# Patient Record
Sex: FEMALE | Race: WHITE | NOT HISPANIC OR LATINO | URBAN - METROPOLITAN AREA
[De-identification: names, ages, dates, MRNs, and addresses within clinical notes are randomized per-mention and may not be internally consistent; named-entity substitution may affect disease eponyms.]

---

## 2018-10-04 ENCOUNTER — OUTPATIENT (OUTPATIENT)
Dept: OUTPATIENT SERVICES | Facility: HOSPITAL | Age: 43
LOS: 1 days | Discharge: HOME | End: 2018-10-04

## 2018-10-04 VITALS
RESPIRATION RATE: 18 BRPM | DIASTOLIC BLOOD PRESSURE: 76 MMHG | HEART RATE: 65 BPM | OXYGEN SATURATION: 100 % | HEIGHT: 71 IN | TEMPERATURE: 98 F | SYSTOLIC BLOOD PRESSURE: 119 MMHG | WEIGHT: 284.4 LBS

## 2018-10-04 DIAGNOSIS — E66.9 OBESITY, UNSPECIFIED: ICD-10-CM

## 2018-10-04 DIAGNOSIS — M24.131 OTHER ARTICULAR CARTILAGE DISORDERS, RIGHT WRIST: ICD-10-CM

## 2018-10-04 DIAGNOSIS — K25.1 ACUTE GASTRIC ULCER WITH PERFORATION: Chronic | ICD-10-CM

## 2018-10-04 DIAGNOSIS — Z01.818 ENCOUNTER FOR OTHER PREPROCEDURAL EXAMINATION: ICD-10-CM

## 2018-10-04 LAB
ALBUMIN SERPL ELPH-MCNC: 4.5 G/DL — SIGNIFICANT CHANGE UP (ref 3.5–5.2)
ALP SERPL-CCNC: 82 U/L — SIGNIFICANT CHANGE UP (ref 30–115)
ALT FLD-CCNC: 33 U/L — SIGNIFICANT CHANGE UP (ref 0–41)
ANION GAP SERPL CALC-SCNC: 14 MMOL/L — SIGNIFICANT CHANGE UP (ref 7–14)
APTT BLD: 32.5 SEC — SIGNIFICANT CHANGE UP (ref 27–39.2)
AST SERPL-CCNC: 31 U/L — SIGNIFICANT CHANGE UP (ref 0–41)
BASOPHILS # BLD AUTO: 0.05 K/UL — SIGNIFICANT CHANGE UP (ref 0–0.2)
BASOPHILS NFR BLD AUTO: 0.8 % — SIGNIFICANT CHANGE UP (ref 0–1)
BILIRUB SERPL-MCNC: 0.7 MG/DL — SIGNIFICANT CHANGE UP (ref 0.2–1.2)
BUN SERPL-MCNC: 19 MG/DL — SIGNIFICANT CHANGE UP (ref 10–20)
CALCIUM SERPL-MCNC: 9.1 MG/DL — SIGNIFICANT CHANGE UP (ref 8.5–10.1)
CHLORIDE SERPL-SCNC: 104 MMOL/L — SIGNIFICANT CHANGE UP (ref 98–110)
CO2 SERPL-SCNC: 23 MMOL/L — SIGNIFICANT CHANGE UP (ref 17–32)
CREAT SERPL-MCNC: 0.9 MG/DL — SIGNIFICANT CHANGE UP (ref 0.7–1.5)
EOSINOPHIL # BLD AUTO: 0 K/UL — SIGNIFICANT CHANGE UP (ref 0–0.7)
EOSINOPHIL NFR BLD AUTO: 0 % — SIGNIFICANT CHANGE UP (ref 0–8)
GLUCOSE SERPL-MCNC: 83 MG/DL — SIGNIFICANT CHANGE UP (ref 70–99)
HCT VFR BLD CALC: 40.8 % — SIGNIFICANT CHANGE UP (ref 37–47)
HGB BLD-MCNC: 13.7 G/DL — SIGNIFICANT CHANGE UP (ref 12–16)
IMM GRANULOCYTES NFR BLD AUTO: 0.5 % — HIGH (ref 0.1–0.3)
INR BLD: 1.09 RATIO — SIGNIFICANT CHANGE UP (ref 0.65–1.3)
LYMPHOCYTES # BLD AUTO: 2.69 K/UL — SIGNIFICANT CHANGE UP (ref 1.2–3.4)
LYMPHOCYTES # BLD AUTO: 45 % — SIGNIFICANT CHANGE UP (ref 20.5–51.1)
MCHC RBC-ENTMCNC: 29.7 PG — SIGNIFICANT CHANGE UP (ref 27–31)
MCHC RBC-ENTMCNC: 33.6 G/DL — SIGNIFICANT CHANGE UP (ref 32–37)
MCV RBC AUTO: 88.3 FL — SIGNIFICANT CHANGE UP (ref 81–99)
MONOCYTES # BLD AUTO: 0.51 K/UL — SIGNIFICANT CHANGE UP (ref 0.1–0.6)
MONOCYTES NFR BLD AUTO: 8.5 % — SIGNIFICANT CHANGE UP (ref 1.7–9.3)
NEUTROPHILS # BLD AUTO: 2.7 K/UL — SIGNIFICANT CHANGE UP (ref 1.4–6.5)
NEUTROPHILS NFR BLD AUTO: 45.2 % — SIGNIFICANT CHANGE UP (ref 42.2–75.2)
NRBC # BLD: 0 /100 WBCS — SIGNIFICANT CHANGE UP (ref 0–0)
PLATELET # BLD AUTO: 258 K/UL — SIGNIFICANT CHANGE UP (ref 130–400)
POTASSIUM SERPL-MCNC: 4.7 MMOL/L — SIGNIFICANT CHANGE UP (ref 3.5–5)
POTASSIUM SERPL-SCNC: 4.7 MMOL/L — SIGNIFICANT CHANGE UP (ref 3.5–5)
PROT SERPL-MCNC: 7.4 G/DL — SIGNIFICANT CHANGE UP (ref 6–8)
PROTHROM AB SERPL-ACNC: 11.8 SEC — SIGNIFICANT CHANGE UP (ref 9.95–12.87)
RBC # BLD: 4.62 M/UL — SIGNIFICANT CHANGE UP (ref 4.2–5.4)
RBC # FLD: 12.8 % — SIGNIFICANT CHANGE UP (ref 11.5–14.5)
SODIUM SERPL-SCNC: 141 MMOL/L — SIGNIFICANT CHANGE UP (ref 135–146)
WBC # BLD: 5.98 K/UL — SIGNIFICANT CHANGE UP (ref 4.8–10.8)
WBC # FLD AUTO: 5.98 K/UL — SIGNIFICANT CHANGE UP (ref 4.8–10.8)

## 2018-10-04 NOTE — H&P PST ADULT - HISTORY OF PRESENT ILLNESS
"SHE'S CLEANING OUT THE FLUID IN MY RIGHT WRIST AND MAYBE FIXING A TEAR"  PT CURRENTLY DENIES CHEST PAIN, PALPITATIONS, DYSURIA, RECENT ILLNESS  EXERCISE TOLERANCE 10 BLOCKS  PT DENIES ANY RASHES, ABRASION, OR OPEN WOUNDS OR CUTS "SHE'S CLEANING OUT THE FLUID IN MY RIGHT WRIST AND MAYBE FIXING A TEAR"  PT CURRENTLY DENIES CHEST PAIN, PALPITATIONS, DYSURIA, RECENT ILLNESS  EXERCISE TOLERANCE 10 BLOCKS  PT DENIES ANY RASHES, ABRASION, OR OPEN WOUNDS OR CUTS    AS PER THE PT, THIS IS A COMPLETE MEDICAL AND SURGICAL HX, INCLUDING MEDICATIONS PRESCRIBED AND OVER THE COUNTER

## 2018-10-18 ENCOUNTER — OUTPATIENT (OUTPATIENT)
Dept: OUTPATIENT SERVICES | Facility: HOSPITAL | Age: 43
LOS: 1 days | Discharge: HOME | End: 2018-10-18

## 2018-10-18 ENCOUNTER — RESULT REVIEW (OUTPATIENT)
Age: 43
End: 2018-10-18

## 2018-10-18 VITALS
SYSTOLIC BLOOD PRESSURE: 116 MMHG | OXYGEN SATURATION: 99 % | HEART RATE: 65 BPM | RESPIRATION RATE: 18 BRPM | DIASTOLIC BLOOD PRESSURE: 73 MMHG

## 2018-10-18 VITALS
TEMPERATURE: 98 F | SYSTOLIC BLOOD PRESSURE: 125 MMHG | DIASTOLIC BLOOD PRESSURE: 84 MMHG | RESPIRATION RATE: 18 BRPM | HEIGHT: 71 IN | WEIGHT: 284.4 LBS | HEART RATE: 73 BPM

## 2018-10-18 DIAGNOSIS — K25.1 ACUTE GASTRIC ULCER WITH PERFORATION: Chronic | ICD-10-CM

## 2018-10-18 RX ORDER — ONDANSETRON 8 MG/1
4 TABLET, FILM COATED ORAL ONCE
Qty: 0 | Refills: 0 | Status: DISCONTINUED | OUTPATIENT
Start: 2018-10-18 | End: 2018-11-02

## 2018-10-18 RX ORDER — OXYCODONE AND ACETAMINOPHEN 5; 325 MG/1; MG/1
1 TABLET ORAL ONCE
Qty: 0 | Refills: 0 | Status: DISCONTINUED | OUTPATIENT
Start: 2018-10-18 | End: 2018-10-18

## 2018-10-18 RX ORDER — FAMOTIDINE 10 MG/ML
20 INJECTION INTRAVENOUS ONCE
Qty: 0 | Refills: 0 | Status: DISCONTINUED | OUTPATIENT
Start: 2018-10-18 | End: 2018-11-02

## 2018-10-18 RX ORDER — DIPHENHYDRAMINE HCL 50 MG
50 CAPSULE ORAL ONCE
Qty: 0 | Refills: 0 | Status: COMPLETED | OUTPATIENT
Start: 2018-10-18 | End: 2018-10-18

## 2018-10-18 RX ORDER — SODIUM CHLORIDE 9 MG/ML
1000 INJECTION, SOLUTION INTRAVENOUS
Qty: 0 | Refills: 0 | Status: DISCONTINUED | OUTPATIENT
Start: 2018-10-18 | End: 2018-11-02

## 2018-10-18 RX ORDER — HYDROMORPHONE HYDROCHLORIDE 2 MG/ML
0.5 INJECTION INTRAMUSCULAR; INTRAVENOUS; SUBCUTANEOUS
Qty: 0 | Refills: 0 | Status: DISCONTINUED | OUTPATIENT
Start: 2018-10-18 | End: 2018-10-18

## 2018-10-18 RX ADMIN — SODIUM CHLORIDE 100 MILLILITER(S): 9 INJECTION, SOLUTION INTRAVENOUS at 12:04

## 2018-10-18 RX ADMIN — Medication 50 MILLIGRAM(S): at 09:30

## 2018-10-18 NOTE — BRIEF OPERATIVE NOTE - POST-OP DX
Degenerative TFCC tear, right  10/18/2018    Active  Leola Melo Synovial hypertrophy of right hand, not elsewhere classified  10/18/2018  right wrist  Active  Leola Melo

## 2018-10-18 NOTE — BRIEF OPERATIVE NOTE - PROCEDURE
<<-----Click on this checkbox to enter Procedure Arthroscopy of wrist with debridement and repair of triangular fibrocartilage complex (TFCC)  10/18/2018    Active  HOLLY  Arthroscopy of wrist with debridement  10/18/2018    Active  HOLLY Arthroscopy of wrist with debridement  10/18/2018    Active  Leola Melo

## 2018-10-22 LAB — SURGICAL PATHOLOGY STUDY: SIGNIFICANT CHANGE UP

## 2018-10-24 DIAGNOSIS — M67.241: ICD-10-CM

## 2018-10-24 DIAGNOSIS — Z79.891 LONG TERM (CURRENT) USE OF OPIATE ANALGESIC: ICD-10-CM

## 2018-10-24 DIAGNOSIS — E66.9 OBESITY, UNSPECIFIED: ICD-10-CM

## 2018-10-24 DIAGNOSIS — K22.70 BARRETT'S ESOPHAGUS WITHOUT DYSPLASIA: ICD-10-CM

## 2018-10-24 DIAGNOSIS — M24.131 OTHER ARTICULAR CARTILAGE DISORDERS, RIGHT WRIST: ICD-10-CM

## 2018-10-24 DIAGNOSIS — Z88.0 ALLERGY STATUS TO PENICILLIN: ICD-10-CM

## 2018-10-24 DIAGNOSIS — M24.831 OTHER SPECIFIC JOINT DERANGEMENTS OF RIGHT WRIST, NOT ELSEWHERE CLASSIFIED: ICD-10-CM

## 2019-01-16 ENCOUNTER — TRANSCRIPTION ENCOUNTER (OUTPATIENT)
Age: 44
End: 2019-01-16

## 2020-01-27 ENCOUNTER — TRANSCRIPTION ENCOUNTER (OUTPATIENT)
Age: 45
End: 2020-01-27

## 2020-02-26 PROBLEM — K22.70 BARRETT'S ESOPHAGUS WITHOUT DYSPLASIA: Chronic | Status: ACTIVE | Noted: 2018-10-04

## 2020-02-26 PROBLEM — E66.9 OBESITY, UNSPECIFIED: Chronic | Status: ACTIVE | Noted: 2018-10-04

## 2020-02-26 PROBLEM — R74.8 ABNORMAL LEVELS OF OTHER SERUM ENZYMES: Chronic | Status: ACTIVE | Noted: 2018-10-04

## 2020-03-04 ENCOUNTER — APPOINTMENT (OUTPATIENT)
Dept: PLASTIC SURGERY | Facility: CLINIC | Age: 45
End: 2020-03-04
Payer: COMMERCIAL

## 2020-03-04 VITALS — HEIGHT: 71 IN | BODY MASS INDEX: 37.8 KG/M2 | WEIGHT: 270 LBS

## 2020-03-04 DIAGNOSIS — Z78.9 OTHER SPECIFIED HEALTH STATUS: ICD-10-CM

## 2020-03-04 DIAGNOSIS — Z87.891 PERSONAL HISTORY OF NICOTINE DEPENDENCE: ICD-10-CM

## 2020-03-04 DIAGNOSIS — M79.89 OTHER SPECIFIED SOFT TISSUE DISORDERS: ICD-10-CM

## 2020-03-04 PROCEDURE — 99203 OFFICE O/P NEW LOW 30 MIN: CPT

## 2020-03-05 NOTE — HISTORY OF PRESENT ILLNESS
[FreeTextEntry1] : 46 yo F with PMH of Tavarez's esophagus who presents today for evaluation of right forearm soft tissue mass for the past few weeks. Patient reports a sudden onset of right forearm nodule in the absence of trauma associated with pain and discomfort in the area upon contact. States the mass waxes and wanes. Denies any skin changes, pigmentation, drainage, tingling or paresthesia radiating to the hand. \par Denies any family h/o skin cancer. 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

## 2020-03-05 NOTE — PHYSICAL EXAM
[de-identified] : Well-developed, pleasant female, NAD [de-identified] : NC/AT [de-identified] : PERRL [de-identified] : supple [de-identified] : good inspiratory effort  [de-identified] : RAJWINDERR [de-identified] : soft, nontender  [de-identified] : Right proximal volar forearm with a small (<1 cm) somewhat firm subcutaneous mass, slightly tender to palpation, mobile, no overlying skin changes, no punctum, no pigmentation or cellulitis changes, FROM of right elbow and hand, well-healed surgical scars over dorsal aspect of hand and wrist s/p TFCC tear repair

## 2020-03-05 NOTE — ASSESSMENT
[FreeTextEntry1] : 44 yo F with right proximal forearm soft tissue mass likely lipoma but with somewhat atypical presentation (pain?). \par \par - recommend upper extremity US to delineate soft tissue mass\par - patient reassured\par - all questions answered\par - f/u with Dr. Mckee after study to discuss results and treatment plan- observation vs. excision

## 2020-03-15 ENCOUNTER — FORM ENCOUNTER (OUTPATIENT)
Age: 45
End: 2020-03-15

## 2020-03-16 ENCOUNTER — OUTPATIENT (OUTPATIENT)
Dept: OUTPATIENT SERVICES | Facility: HOSPITAL | Age: 45
LOS: 1 days | Discharge: HOME | End: 2020-03-16
Payer: COMMERCIAL

## 2020-03-16 DIAGNOSIS — K25.1 ACUTE GASTRIC ULCER WITH PERFORATION: Chronic | ICD-10-CM

## 2020-03-16 DIAGNOSIS — R22.31 LOCALIZED SWELLING, MASS AND LUMP, RIGHT UPPER LIMB: ICD-10-CM

## 2020-03-16 DIAGNOSIS — M79.89 OTHER SPECIFIED SOFT TISSUE DISORDERS: ICD-10-CM

## 2020-03-16 PROCEDURE — 76882 US LMTD JT/FCL EVL NVASC XTR: CPT | Mod: 26,RT

## 2020-04-06 ENCOUNTER — APPOINTMENT (OUTPATIENT)
Dept: PLASTIC SURGERY | Facility: CLINIC | Age: 45
End: 2020-04-06

## 2020-08-12 ENCOUNTER — TRANSCRIPTION ENCOUNTER (OUTPATIENT)
Age: 45
End: 2020-08-12

## 2020-08-12 ENCOUNTER — APPOINTMENT (OUTPATIENT)
Dept: OBGYN | Facility: CLINIC | Age: 45
End: 2020-08-12
Payer: COMMERCIAL

## 2020-08-12 VITALS
BODY MASS INDEX: 39.2 KG/M2 | DIASTOLIC BLOOD PRESSURE: 73 MMHG | TEMPERATURE: 97.9 F | HEART RATE: 78 BPM | WEIGHT: 280 LBS | SYSTOLIC BLOOD PRESSURE: 114 MMHG | HEIGHT: 71 IN

## 2020-08-12 DIAGNOSIS — Z30.431 ENCOUNTER FOR ROUTINE CHECKING OF INTRAUTERINE CONTRACEPTIVE DEVICE: ICD-10-CM

## 2020-08-12 DIAGNOSIS — R87.619 UNSPECIFIED ABNORMAL CYTOLOGICAL FINDINGS IN SPECIMENS FROM CERVIX UTERI: ICD-10-CM

## 2020-08-12 DIAGNOSIS — Z78.9 OTHER SPECIFIED HEALTH STATUS: ICD-10-CM

## 2020-08-12 DIAGNOSIS — Z83.3 FAMILY HISTORY OF DIABETES MELLITUS: ICD-10-CM

## 2020-08-12 DIAGNOSIS — B97.7 PAPILLOMAVIRUS AS THE CAUSE OF DISEASES CLASSIFIED ELSEWHERE: ICD-10-CM

## 2020-08-12 DIAGNOSIS — Z86.19 PERSONAL HISTORY OF OTHER INFECTIOUS AND PARASITIC DISEASES: ICD-10-CM

## 2020-08-12 PROCEDURE — 99396 PREV VISIT EST AGE 40-64: CPT

## 2020-08-12 NOTE — CHIEF COMPLAINT
[Annual Visit] : annual visit [FreeTextEntry1] : Patient is here for annual exam , up to date with PE , mammography due now , not getting menses Mirena  IUD inserted one year ago , # 3 , hx abnormal Pap , HPV , no complaints today

## 2020-08-12 NOTE — DISCUSSION/SUMMARY
[FreeTextEntry1] : Patient here for annual exam and to check IUD.\par Has history of abnormal pap and HPV.\par No complaints.\par \par Caron Morton M.D.\par

## 2020-08-12 NOTE — BEGINNING OF VISIT
[Patient] : patient
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

## 2020-08-12 NOTE — PHYSICAL EXAM
[Awake] : awake [Alert] : alert [Acute Distress] : no acute distress [Mass] : no breast mass [Axillary LAD] : no axillary lymphadenopathy [Nipple Discharge] : no nipple discharge [Soft] : soft [Tender] : non tender [Oriented x3] : oriented to person, place, and time [Normal] : uterus [No Bleeding] : there was no active vaginal bleeding [IUD String] : had an IUD string protruding out [Uterine Adnexae] : were not tender and not enlarged

## 2020-08-12 NOTE — HISTORY OF PRESENT ILLNESS
[Definite:  ___ (Date)] : the last menstrual period was [unfilled] [Menarche Age: ____] : age at menarche was [unfilled] [Contraception] : uses contraception [IUD] : has an intrauterine device [Sexually Active] : is sexually active [NA] : N/A [Male ___] : [unfilled] male [1 Year Ago] : 1 year ago [Good] : being in good health [Reproductive Age] : is of reproductive age

## 2020-08-20 LAB — HPV HIGH+LOW RISK DNA PNL CVX: NOT DETECTED

## 2021-07-12 ENCOUNTER — NON-APPOINTMENT (OUTPATIENT)
Age: 46
End: 2021-07-12

## 2021-07-19 ENCOUNTER — NON-APPOINTMENT (OUTPATIENT)
Age: 46
End: 2021-07-19

## 2021-07-20 ENCOUNTER — NON-APPOINTMENT (OUTPATIENT)
Age: 46
End: 2021-07-20

## 2021-07-20 ENCOUNTER — APPOINTMENT (OUTPATIENT)
Dept: OTOLARYNGOLOGY | Facility: CLINIC | Age: 46
End: 2021-07-20
Payer: COMMERCIAL

## 2021-07-20 DIAGNOSIS — M26.609 UNSPECIFIED TEMPOROMANDIBULAR JOINT DISORDER: ICD-10-CM

## 2021-07-20 DIAGNOSIS — R22.1 LOCALIZED SWELLING, MASS AND LUMP, NECK: ICD-10-CM

## 2021-07-20 DIAGNOSIS — M54.2 CERVICALGIA: ICD-10-CM

## 2021-07-20 DIAGNOSIS — H92.01 OTALGIA, RIGHT EAR: ICD-10-CM

## 2021-07-20 PROCEDURE — 31575 DIAGNOSTIC LARYNGOSCOPY: CPT

## 2021-07-20 PROCEDURE — 99204 OFFICE O/P NEW MOD 45 MIN: CPT | Mod: 25

## 2021-07-20 PROCEDURE — 99072 ADDL SUPL MATRL&STAF TM PHE: CPT

## 2021-07-20 NOTE — HISTORY OF PRESENT ILLNESS
[de-identified] : Patient presents today with c/o right sided neck pain. Pain has been present for about 1 month. Pain radiates to her right ear. No change in hearing. No dysphagia. No choking. No sore throat. She states awkward pain in her neck. Patient saw by her primary told to have swelling- possibly related to her salivary gland. Patient has been taking Motrin for pain. Muscle relaxant was not helpful. Pt has TMJ and has night brace,

## 2021-08-10 ENCOUNTER — APPOINTMENT (OUTPATIENT)
Dept: UROLOGY | Facility: CLINIC | Age: 46
End: 2021-08-10
Payer: COMMERCIAL

## 2021-08-10 VITALS — WEIGHT: 270 LBS | BODY MASS INDEX: 37.8 KG/M2 | HEIGHT: 71 IN

## 2021-08-10 LAB
BILIRUB UR QL STRIP: NORMAL
COLLECTION METHOD: NORMAL
GLUCOSE UR-MCNC: NORMAL
HCG UR QL: 0.2 EU/DL
HGB UR QL STRIP.AUTO: NORMAL
KETONES UR-MCNC: NORMAL
LEUKOCYTE ESTERASE UR QL STRIP: NORMAL
NITRITE UR QL STRIP: NORMAL
PH UR STRIP: 5.5
PROT UR STRIP-MCNC: NORMAL
SP GR UR STRIP: 1.02

## 2021-08-10 PROCEDURE — 99203 OFFICE O/P NEW LOW 30 MIN: CPT

## 2021-08-10 NOTE — END OF VISIT
[FreeTextEntry3] : Pt seen ,evaluated ,examined  by me with PA/ RN present and agree to findings , plan\par

## 2021-08-10 NOTE — PHYSICAL EXAM
[General Appearance - Well Developed] : well developed [Normal Appearance] : normal appearance [Well Groomed] : well groomed [General Appearance - In No Acute Distress] : no acute distress [Edema] : no peripheral edema [Respiration, Rhythm And Depth] : normal respiratory rhythm and effort [Exaggerated Use Of Accessory Muscles For Inspiration] : no accessory muscle use [Abdomen Soft] : soft [Abdomen Tenderness] : non-tender [Costovertebral Angle Tenderness] : no ~M costovertebral angle tenderness [Normal Station and Gait] : the gait and station were normal for the patient's age [] : no rash [No Focal Deficits] : no focal deficits [Oriented To Time, Place, And Person] : oriented to person, place, and time [Affect] : the affect was normal [Mood] : the mood was normal [Not Anxious] : not anxious [No Palpable Adenopathy] : no palpable adenopathy [FreeTextEntry1] : deferred to gyn

## 2021-08-10 NOTE — HISTORY OF PRESENT ILLNESS
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

## 2021-08-10 NOTE — ASSESSMENT
[FreeTextEntry1] : 1. AMH\par \par Plan:\par -ct urogram\par -urine cytology\par -rto 4 weeks- will need cystoscopy\par

## 2021-08-10 NOTE — LETTER BODY
[Dear  ___] : Dear  [unfilled], [Consult Letter:] : I had the pleasure of evaluating your patient, [unfilled]. [( Thank you for referring [unfilled] for consultation for _____ )] : Thank you for referring [unfilled] for consultation for [unfilled] [Consult Closing:] : Thank you very much for allowing me to participate in the care of this patient.  If you have any questions, please do not hesitate to contact me. [FreeTextEntry1] : This is to summarize the consultation for Korin Myers seen August 10, 2021.\par \par Impression= asymptomatic microhematuria. This was documented on 2 urinalysis. These urine studies all within the urologic criteria for microhematuria. History is significant for previous smoking but has since stopped. Physical exam was noncontributory\par \par Plan= CT urogram. Urine cytology. Will need cystoscopy

## 2021-08-12 ENCOUNTER — RESULT REVIEW (OUTPATIENT)
Age: 46
End: 2021-08-12

## 2021-08-12 ENCOUNTER — OUTPATIENT (OUTPATIENT)
Dept: OUTPATIENT SERVICES | Facility: HOSPITAL | Age: 46
LOS: 1 days | Discharge: HOME | End: 2021-08-12
Payer: COMMERCIAL

## 2021-08-12 DIAGNOSIS — R22.1 LOCALIZED SWELLING, MASS AND LUMP, NECK: ICD-10-CM

## 2021-08-12 DIAGNOSIS — K25.1 ACUTE GASTRIC ULCER WITH PERFORATION: Chronic | ICD-10-CM

## 2021-08-12 PROCEDURE — 70543 MRI ORBT/FAC/NCK W/O &W/DYE: CPT | Mod: 26

## 2021-08-18 LAB
BUN SERPL-MCNC: 18 MG/DL
CREAT SERPL-MCNC: 0.8 MG/DL
URINE CYTOLOGY: NORMAL

## 2021-08-27 ENCOUNTER — OUTPATIENT (OUTPATIENT)
Dept: OUTPATIENT SERVICES | Facility: HOSPITAL | Age: 46
LOS: 1 days | Discharge: HOME | End: 2021-08-27
Payer: COMMERCIAL

## 2021-08-27 DIAGNOSIS — R31.29 OTHER MICROSCOPIC HEMATURIA: ICD-10-CM

## 2021-08-27 DIAGNOSIS — K25.1 ACUTE GASTRIC ULCER WITH PERFORATION: Chronic | ICD-10-CM

## 2021-08-27 PROCEDURE — 74178 CT ABD&PLV WO CNTR FLWD CNTR: CPT | Mod: 26

## 2021-08-30 ENCOUNTER — NON-APPOINTMENT (OUTPATIENT)
Age: 46
End: 2021-08-30

## 2021-09-23 ENCOUNTER — APPOINTMENT (OUTPATIENT)
Dept: UROLOGY | Facility: CLINIC | Age: 46
End: 2021-09-23
Payer: COMMERCIAL

## 2021-09-23 DIAGNOSIS — R31.29 OTHER MICROSCOPIC HEMATURIA: ICD-10-CM

## 2021-09-23 DIAGNOSIS — R39.9 UNSPECIFIED SYMPTOMS AND SIGNS INVOLVING THE GENITOURINARY SYSTEM: ICD-10-CM

## 2021-09-23 PROCEDURE — 99213 OFFICE O/P EST LOW 20 MIN: CPT

## 2021-09-23 NOTE — PHYSICAL EXAM
[General Appearance - Well Developed] : well developed [Normal Appearance] : normal appearance [Well Groomed] : well groomed [General Appearance - In No Acute Distress] : no acute distress [Abdomen Soft] : soft [Abdomen Tenderness] : non-tender [Costovertebral Angle Tenderness] : no ~M costovertebral angle tenderness [Edema] : no peripheral edema [] : no respiratory distress [Exaggerated Use Of Accessory Muscles For Inspiration] : no accessory muscle use [Respiration, Rhythm And Depth] : normal respiratory rhythm and effort [Oriented To Time, Place, And Person] : oriented to person, place, and time [Affect] : the affect was normal [Mood] : the mood was normal [Not Anxious] : not anxious [Normal Station and Gait] : the gait and station were normal for the patient's age [No Focal Deficits] : no focal deficits [No Palpable Adenopathy] : no palpable adenopathy [FreeTextEntry1] : tattoo right forearm

## 2021-09-23 NOTE — ASSESSMENT
[FreeTextEntry1] : 1. AMH\par 2. LUTS \par \par Plan:\par -Patient requested CT results to show to PCP - copy was given.\par -Discussed CT and cytology results. \par -Discussed the impact of smoking on the bladder.\par -Cystoscopy - local/in office patient desires.\par -informed consent obtained, risks and benefits discussed including but not limited to infection, bleeding, sepsis, bladder perforation, post op retention, unforeseen complications such as, MI, CVA, DVT, PE.\par \par \par

## 2021-09-23 NOTE — HISTORY OF PRESENT ILLNESS
[FreeTextEntry1] : 46 year old female presents to the office for follow up of microhematuria. Patient denies gross blood and voids without difficulty. She uses Liletta- IUD with hormone- no menses. She denies any fever, nauseous, and other constitutional symptoms. \par \par All past and present data reviewed:\par UA- 6/21- 5 RBC\par UA- 7/21- 7RBC\par 08/2021 UA= BLO moderate\par 08/2021 CT Urogram= normal kidneys   //   IUD   //  (bladder reviewed, appears within normal limits.\par 08/2021 Urine Cytology= negative for malignant cells \par  [Urinary Urgency] : urinary urgency [Urinary Frequency] : urinary frequency [Nocturia] : no nocturia [Straining] : no straining [Weak Stream] : no weak stream [Dysuria] : no dysuria [Hematuria - Gross] : no gross hematuria

## 2021-09-23 NOTE — END OF VISIT
[FreeTextEntry3] : Patient notes was transcribed by he Martinez under the supervision of Dr. Siddiqui.\par And I have   reviewed the patient's chart and agree that it alines  with my medical decisions.\par

## 2021-11-04 ENCOUNTER — APPOINTMENT (OUTPATIENT)
Dept: UROLOGY | Facility: CLINIC | Age: 46
End: 2021-11-04

## 2021-11-15 ENCOUNTER — TRANSCRIPTION ENCOUNTER (OUTPATIENT)
Age: 46
End: 2021-11-15

## 2022-01-06 ENCOUNTER — APPOINTMENT (OUTPATIENT)
Dept: GASTROENTEROLOGY | Facility: CLINIC | Age: 47
End: 2022-01-06

## 2022-03-21 ENCOUNTER — APPOINTMENT (OUTPATIENT)
Dept: GASTROENTEROLOGY | Facility: CLINIC | Age: 47
End: 2022-03-21
Payer: COMMERCIAL

## 2022-03-21 VITALS — TEMPERATURE: 98.1 F | HEIGHT: 71 IN | WEIGHT: 280 LBS | BODY MASS INDEX: 39.2 KG/M2

## 2022-03-21 PROCEDURE — 99204 OFFICE O/P NEW MOD 45 MIN: CPT

## 2022-03-21 NOTE — HISTORY OF PRESENT ILLNESS
[de-identified] : 47-year-old female self-referred for a new visit office.  Previously scheduled to see Dr. Hoffman for Tavarez's esophagus.\par The patient is morbidly obese reports a history of Tavarez's esophagus change her GI due to a change in her insurance plan.  Patient states he had a complicated ERCP a few years ago [facility].  Also states that she has a liver cyst which is being monitored by yearly ultrasounds.  She also mentions that at some point her LFTs were highly elevated to as high as 900s.  Currently her only complaint is acid reflux when she does not take her omeprazole 40 mg once daily which she has run out of.  Denies family history of colon cancer, gastric cancer.

## 2022-03-21 NOTE — PHYSICAL EXAM
[General Appearance - Alert] : alert [General Appearance - In No Acute Distress] : in no acute distress [Sclera] : the sclera and conjunctiva were normal [Outer Ear] : the ears and nose were normal in appearance [Neck Appearance] : the appearance of the neck was normal [Abdomen Soft] : soft [Abdomen Tenderness] : non-tender [Abnormal Walk] : normal gait [Skin Color & Pigmentation] : normal skin color and pigmentation [No Focal Deficits] : no focal deficits [Oriented To Time, Place, And Person] : oriented to person, place, and time

## 2022-03-21 NOTE — ASSESSMENT
[FreeTextEntry1] : 47-year-old female presenting to establish care\par GERD\par History of Tavarez's (reportedly)\par History of abnormal LFTs -NAFLD?(reportedly)\par History of ERCP-complicated?  Perf (reportedly)\par Morbid obesity(reportedly)\par History of liver cyst (reportedly)\par Average risk for CRC screening- past due for initial screening\par \par Plan:\par CMP\par refill omeprazole 40\par EGD\par Colonosocpy\par Discussed weight loss and mentioned Bariatric surgery\par US RUQ\par REferal to hepatology

## 2022-03-27 ENCOUNTER — NON-APPOINTMENT (OUTPATIENT)
Age: 47
End: 2022-03-27

## 2022-03-28 ENCOUNTER — TRANSCRIPTION ENCOUNTER (OUTPATIENT)
Age: 47
End: 2022-03-28

## 2022-03-31 ENCOUNTER — RESULT REVIEW (OUTPATIENT)
Age: 47
End: 2022-03-31

## 2022-03-31 ENCOUNTER — OUTPATIENT (OUTPATIENT)
Dept: OUTPATIENT SERVICES | Facility: HOSPITAL | Age: 47
LOS: 1 days | Discharge: HOME | End: 2022-03-31
Payer: COMMERCIAL

## 2022-03-31 DIAGNOSIS — K25.1 ACUTE GASTRIC ULCER WITH PERFORATION: Chronic | ICD-10-CM

## 2022-03-31 DIAGNOSIS — K21.9 GASTRO-ESOPHAGEAL REFLUX DISEASE WITHOUT ESOPHAGITIS: ICD-10-CM

## 2022-03-31 PROCEDURE — 76705 ECHO EXAM OF ABDOMEN: CPT | Mod: 26

## 2022-04-01 LAB
ALBUMIN SERPL ELPH-MCNC: 4.3 G/DL
ALP BLD-CCNC: 92 U/L
ALT SERPL-CCNC: 49 U/L
ANION GAP SERPL CALC-SCNC: 15 MMOL/L
AST SERPL-CCNC: 47 U/L
BASOPHILS # BLD AUTO: 0.04 K/UL
BASOPHILS NFR BLD AUTO: 0.7 %
BILIRUB SERPL-MCNC: 0.6 MG/DL
BUN SERPL-MCNC: 26 MG/DL
CALCIUM SERPL-MCNC: 9.2 MG/DL
CHLORIDE SERPL-SCNC: 102 MMOL/L
CO2 SERPL-SCNC: 22 MMOL/L
CREAT SERPL-MCNC: 1 MG/DL
EGFR: 69 ML/MIN/1.73M2
EOSINOPHIL # BLD AUTO: 0 K/UL
EOSINOPHIL NFR BLD AUTO: 0 %
GLUCOSE SERPL-MCNC: 101 MG/DL
HCT VFR BLD CALC: 42.2 %
HGB BLD-MCNC: 13.6 G/DL
IMM GRANULOCYTES NFR BLD AUTO: 0.2 %
INR PPP: 1.07 RATIO
LYMPHOCYTES # BLD AUTO: 2.87 K/UL
LYMPHOCYTES NFR BLD AUTO: 51.4 %
MAN DIFF?: NORMAL
MCHC RBC-ENTMCNC: 29.2 PG
MCHC RBC-ENTMCNC: 32.2 G/DL
MCV RBC AUTO: 90.6 FL
MONOCYTES # BLD AUTO: 0.61 K/UL
MONOCYTES NFR BLD AUTO: 10.9 %
NEUTROPHILS # BLD AUTO: 2.05 K/UL
NEUTROPHILS NFR BLD AUTO: 36.8 %
PLATELET # BLD AUTO: 173 K/UL
POTASSIUM SERPL-SCNC: 4.3 MMOL/L
PROT SERPL-MCNC: 7.3 G/DL
PT BLD: 12.3 SEC
RBC # BLD: 4.66 M/UL
RBC # FLD: 13.2 %
SODIUM SERPL-SCNC: 139 MMOL/L
WBC # FLD AUTO: 5.58 K/UL

## 2022-04-20 ENCOUNTER — NON-APPOINTMENT (OUTPATIENT)
Age: 47
End: 2022-04-20

## 2022-04-20 ENCOUNTER — APPOINTMENT (OUTPATIENT)
Age: 47
End: 2022-04-20
Payer: COMMERCIAL

## 2022-04-20 VITALS
SYSTOLIC BLOOD PRESSURE: 116 MMHG | BODY MASS INDEX: 41.02 KG/M2 | WEIGHT: 293 LBS | DIASTOLIC BLOOD PRESSURE: 82 MMHG | RESPIRATION RATE: 14 BRPM | HEART RATE: 94 BPM | HEIGHT: 71 IN | OXYGEN SATURATION: 97 %

## 2022-04-20 PROCEDURE — 99203 OFFICE O/P NEW LOW 30 MIN: CPT

## 2022-04-20 NOTE — ASSESSMENT
[FreeTextEntry1] : Post viral RADS \par Some bibasilar atelectasis and pleural nodularity on CT abdomen 8-21

## 2022-04-20 NOTE — REASON FOR VISIT
[Initial] : an initial visit [Abnormal CXR/ Chest CT] : an abnormal CXR/ chest CT [Asthma] : asthma [Cough] : cough

## 2022-04-20 NOTE — HISTORY OF PRESENT ILLNESS
[Doing Well] : doing well [Well Controlled] : Well controlled [Wheezing] : stable wheezing [Cough] : stable coughing [Chest Tightness Or Heavy Pressure] : stable chest tightness [Checks Regularly] : The patient checks ~his/her~ peak flow regularly [Good Control] : peak flow has been good [None] : None [Adherent] : the patient is adherent with ~his/her~ medication regimen [PFTs] : pulmonary function tests [de-identified] : Post viral RADS  [Initial Evaluation] : an initial evaluation of [Cough] : cough [Dyspnea] : dyspnea [Currently Experiencing] : The patient is currently experiencing symptoms.

## 2022-04-28 ENCOUNTER — APPOINTMENT (OUTPATIENT)
Dept: GASTROENTEROLOGY | Facility: CLINIC | Age: 47
End: 2022-04-28
Payer: COMMERCIAL

## 2022-04-28 DIAGNOSIS — R89.6 ABNORMAL CYTOLOGICAL FINDINGS IN SPECIMENS FROM OTHER ORGANS, SYSTEMS AND TISSUES: ICD-10-CM

## 2022-04-28 PROCEDURE — 99205 OFFICE O/P NEW HI 60 MIN: CPT

## 2022-04-28 PROCEDURE — 99215 OFFICE O/P EST HI 40 MIN: CPT

## 2022-04-28 PROCEDURE — 91200 LIVER ELASTOGRAPHY: CPT

## 2022-04-28 RX ORDER — PEG-3350, SODIUM SULFATE, SODIUM CHLORIDE, POTASSIUM CHLORIDE, SODIUM ASCORBATE AND ASCORBIC ACID 7.5-2.691G
100 KIT ORAL
Qty: 1 | Refills: 0 | Status: DISCONTINUED | COMMUNITY
Start: 2022-03-21 | End: 2022-04-28

## 2022-04-28 RX ORDER — DIAZEPAM 5 MG/1
5 TABLET ORAL
Qty: 2 | Refills: 0 | Status: DISCONTINUED | COMMUNITY
Start: 2021-07-20 | End: 2022-04-28

## 2022-04-28 RX ORDER — OMEPRAZOLE 20 MG/1
20 CAPSULE, DELAYED RELEASE ORAL
Refills: 0 | Status: DISCONTINUED | COMMUNITY
End: 2022-04-28

## 2022-04-29 PROBLEM — R89.6: Status: RESOLVED | Noted: 2020-08-12 | Resolved: 2022-04-29

## 2022-04-29 NOTE — ASSESSMENT
[FreeTextEntry1] : 47 year old  female with morbid obesity Barretts seen for elevated transaminases\par \par Elevated transaminases\par Reports admission for jaundice and severe elevation of transaminases in 2015 with ERCP complicated by perforation and pancreatitis. No jaundice after that. \par Since than has mild elevation of transaminases and told had fatty liver. \par AST ALT 40s in March \par US did not report steatosis\par Patient is  morbidly obese but does not have other components of metabolic syndrome\par A1c 5.4 LDL TG normal\par Fibrosan showed CAP score 350 S2 LS 7.9 kPa F0 -1\par Will do CLD work up\par \par Morbid obesity \par Has severe steatosis but no fibrosis on VCTE\par BMI 44\par Would be a candidate for semaglutide therapy \par \par Hepatic cyst\par Benign appearance. 2 cm left hepatic\par \par Health maintenance\par HCV negative\par Colonoscopy being planned when resp status improves

## 2022-04-29 NOTE — REVIEW OF SYSTEMS
[Wheezing] : wheezing [Cough] : cough [SOB on Exertion] : shortness of breath during exertion [Arthralgias] : arthralgias [Fever] : no fever [Chills] : no chills [Eye Pain] : no eye pain [Earache] : no earache [Chest Pain] : no chest pain [Palpitations] : no palpitations [Abdominal Pain] : no abdominal pain [Vomiting] : no vomiting [Constipation] : no constipation [Diarrhea] : no diarrhea [Heartburn] : no heartburn [Melena] : no melena [Easy Bleeding] : no tendency for easy bleeding

## 2022-04-29 NOTE — PHYSICAL EXAM
[General Appearance - Alert] : alert [General Appearance - Well Nourished] : well nourished [General Appearance - Well Developed] : well developed [Sclera] : the sclera and conjunctiva were normal [Outer Ear] : the ears and nose were normal in appearance [Neck Cervical Mass (___cm)] : no neck mass was observed [Jugular Venous Distention Increased] : there was no jugular-venous distention [Thyroid Diffuse Enlargement] : the thyroid was not enlarged [Respiration, Rhythm And Depth] : normal respiratory rhythm and effort [Auscultation Breath Sounds / Voice Sounds] : lungs were clear to auscultation bilaterally [Heart Sounds] : normal S1 and S2 [Murmurs] : no murmurs [Abdomen Soft] : soft [Abdomen Tenderness] : non-tender [] : no hepato-splenomegaly [Nail Clubbing] : no clubbing  or cyanosis of the fingernails [No Focal Deficits] : no focal deficits [Oriented To Time, Place, And Person] : oriented to person, place, and time [Scleral Icterus] : No Scleral Icterus [Spider Angioma] : No spider angioma(s) were observed [Abdominal  Ascites] : no ascites [Splenomegaly] : no splenomegaly [Liver Palpable ___ Finger Breadths Below Costal Margin] : was not palpable below costal margin [Asterixis] : no asterixis observed [Jaundice] : No jaundice [FreeTextEntry1] : Morbidly obese

## 2022-04-29 NOTE — REASON FOR VISIT
[Consultation] : a consultation visit [FreeTextEntry1] : NP- ref by Dr. Martinez Fletcher - elevated liver enzymes

## 2022-05-02 LAB
ALBUMIN SERPL ELPH-MCNC: 4.1 G/DL
ALP BLD-CCNC: 92 U/L
ALT SERPL-CCNC: 17 U/L
ANION GAP SERPL CALC-SCNC: 13 MMOL/L
AST SERPL-CCNC: 15 U/L
BASOPHILS # BLD AUTO: 0.07 K/UL
BASOPHILS NFR BLD AUTO: 0.5 %
BILIRUB SERPL-MCNC: 0.3 MG/DL
BUN SERPL-MCNC: 27 MG/DL
CALCIUM SERPL-MCNC: 9.4 MG/DL
CERULOPLASMIN SERPL-MCNC: 26 MG/DL
CHLORIDE SERPL-SCNC: 99 MMOL/L
CO2 SERPL-SCNC: 25 MMOL/L
CREAT SERPL-MCNC: 0.9 MG/DL
EGFR: 79 ML/MIN/1.73M2
EOSINOPHIL # BLD AUTO: 0 K/UL
EOSINOPHIL NFR BLD AUTO: 0 %
FERRITIN SERPL-MCNC: 67 NG/ML
GLUCOSE SERPL-MCNC: 72 MG/DL
HCT VFR BLD CALC: 43.8 %
HEPATITIS A IGG ANTIBODY: NONREACTIVE
HGB BLD-MCNC: 14.2 G/DL
IMM GRANULOCYTES NFR BLD AUTO: 1.1 %
INR PPP: 0.97 RATIO
IRON SATN MFR SERPL: 21 %
IRON SERPL-MCNC: 64 UG/DL
LYMPHOCYTES # BLD AUTO: 4.55 K/UL
LYMPHOCYTES NFR BLD AUTO: 32.2 %
MAN DIFF?: NORMAL
MCHC RBC-ENTMCNC: 29.5 PG
MCHC RBC-ENTMCNC: 32.4 G/DL
MCV RBC AUTO: 91.1 FL
MONOCYTES # BLD AUTO: 1.27 K/UL
MONOCYTES NFR BLD AUTO: 9 %
NEUTROPHILS # BLD AUTO: 8.1 K/UL
NEUTROPHILS NFR BLD AUTO: 57.2 %
PLATELET # BLD AUTO: 230 K/UL
POTASSIUM SERPL-SCNC: 4.2 MMOL/L
PROT SERPL-MCNC: 7.1 G/DL
PT BLD: 11.2 SEC
RBC # BLD: 4.81 M/UL
RBC # FLD: 14.5 %
SODIUM SERPL-SCNC: 137 MMOL/L
TIBC SERPL-MCNC: 302 UG/DL
TSH SERPL-ACNC: 1.82 UIU/ML
UIBC SERPL-MCNC: 238 UG/DL
WBC # FLD AUTO: 14.14 K/UL

## 2022-05-03 LAB
ACE BLD-CCNC: 31 U/L
DEPRECATED KAPPA LC FREE/LAMBDA SER: 1.02 RATIO
IGA SER QL IEP: 236 MG/DL
IGG SER QL IEP: 1446 MG/DL
IGM SER QL IEP: 77 MG/DL
KAPPA LC CSF-MCNC: 1.76 MG/DL
KAPPA LC SERPL-MCNC: 1.79 MG/DL
TTG IGA SER IA-ACNC: <1.2 U/ML
TTG IGA SER-ACNC: NEGATIVE

## 2022-05-04 ENCOUNTER — RESULT REVIEW (OUTPATIENT)
Age: 47
End: 2022-05-04

## 2022-05-04 ENCOUNTER — OUTPATIENT (OUTPATIENT)
Dept: OUTPATIENT SERVICES | Facility: HOSPITAL | Age: 47
LOS: 1 days | Discharge: HOME | End: 2022-05-04
Payer: COMMERCIAL

## 2022-05-04 DIAGNOSIS — K25.1 ACUTE GASTRIC ULCER WITH PERFORATION: Chronic | ICD-10-CM

## 2022-05-04 DIAGNOSIS — R06.00 DYSPNEA, UNSPECIFIED: ICD-10-CM

## 2022-05-04 LAB
MITOCHONDRIA AB SER IF-ACNC: NORMAL
SMOOTH MUSCLE AB SER QL IF: ABNORMAL

## 2022-05-04 PROCEDURE — 71046 X-RAY EXAM CHEST 2 VIEWS: CPT | Mod: 26

## 2022-05-05 LAB — ANA SER IF-ACNC: NEGATIVE

## 2022-05-06 ENCOUNTER — APPOINTMENT (OUTPATIENT)
Dept: GASTROENTEROLOGY | Facility: CLINIC | Age: 47
End: 2022-05-06

## 2022-05-09 LAB
A1AT PHENOTYP SERPL-IMP: NORMAL
A1AT SERPL-MCNC: 123 MG/DL

## 2022-05-11 ENCOUNTER — APPOINTMENT (OUTPATIENT)
Dept: GASTROENTEROLOGY | Facility: CLINIC | Age: 47
End: 2022-05-11
Payer: COMMERCIAL

## 2022-05-11 VITALS
BODY MASS INDEX: 41.02 KG/M2 | SYSTOLIC BLOOD PRESSURE: 137 MMHG | DIASTOLIC BLOOD PRESSURE: 101 MMHG | HEART RATE: 76 BPM | WEIGHT: 293 LBS | HEIGHT: 71 IN

## 2022-05-11 PROCEDURE — 99214 OFFICE O/P EST MOD 30 MIN: CPT

## 2022-05-11 NOTE — REVIEW OF SYSTEMS
[Heartburn] : heartburn [Fever] : no fever [Eye Pain] : no eye pain [Earache] : no earache [Chest Pain] : no chest pain [Palpitations] : no palpitations [Cough] : no cough [SOB on Exertion] : no shortness of breath during exertion [Constipation] : no constipation [Suicidal] : not suicidal [Easy Bleeding] : no tendency for easy bleeding

## 2022-05-11 NOTE — PHYSICAL EXAM
[General Appearance - Alert] : alert [General Appearance - Well Nourished] : well nourished [General Appearance - Well Developed] : well developed [Sclera] : the sclera and conjunctiva were normal [Outer Ear] : the ears and nose were normal in appearance [Neck Cervical Mass (___cm)] : no neck mass was observed [Jugular Venous Distention Increased] : there was no jugular-venous distention [Thyroid Diffuse Enlargement] : the thyroid was not enlarged [Respiration, Rhythm And Depth] : normal respiratory rhythm and effort [Auscultation Breath Sounds / Voice Sounds] : lungs were clear to auscultation bilaterally [Heart Sounds] : normal S1 and S2 [Murmurs] : no murmurs [Edema] : there was no peripheral edema [Skin Color & Pigmentation] : normal skin color and pigmentation [No Focal Deficits] : no focal deficits [Oriented To Time, Place, And Person] : oriented to person, place, and time [Scleral Icterus] : No Scleral Icterus [Spider Angioma] : No spider angioma(s) were observed [Abdominal  Ascites] : no ascites [Splenomegaly] : no splenomegaly [Liver Palpable ___ Finger Breadths Below Costal Margin] : was not palpable below costal margin [Asterixis] : no asterixis observed [Jaundice] : No jaundice [FreeTextEntry1] : Morbidly obese

## 2022-05-11 NOTE — HISTORY OF PRESENT ILLNESS
[Body Piercing] : body piercing [Fatigue] : denies fatigue [Wt Gain ___ Lbs] : recent [unfilled] ~Upound(s) weight gain [Needlestick Exposure] : no needlestick exposure [Infected Sexual Partner] : no infected sexual partner [IV Drug Use] : no IV drug use [Tattoo] : no tattoos [Transfusion before 1992] : no transfusion before 1992 [Incarceration] : no incarceration [Alcohol Abuse] : no alcohol abuse [Autoimmune Disorder] : no autoimmune disorder [Cocaine Use] : no cocaine use [Wt Loss ___ Lbs] : no recent weight loss [de-identified] : Doing well. No abdominal pain. No jaundice.  [de-identified] : Patient reports that in Dec 2015 she was extremely sick with elevated liver enzymes. She had severe fatigue and jaundice. Tests revealed AST ALT in 900s and she had ERCP done but was complicated by perforation and pancreatitis. Jaundice resolved after a prolonged hospital stay at [facility]. She has monitoring of liver tests every 6 months and AST ALT are in the 40s. She was 250 lb for many years prior and has been 275 in the past year. No Hx of HTN HLD DM. No hx of supplement use, herbal teas or antibiotics. Was on OC pills in her teens. \par Patient also states that she has a cyst on her liver.\par No abdominal pain jaundice itching confusion hematemesis or melena.\par Has been having cough and dyspnea since viral infection last month\par No liver disease or liver cancer in family.\par No family hx of any cancer including medullary cancer of thyroid or MEN\par Drinks 1 - 2 drinks per week.

## 2022-05-11 NOTE — ASSESSMENT
[FreeTextEntry1] : 47 year old  female with morbid obesity Tavarez's seen for elevated transaminases\par \par Elevated transaminases\par Reports admission for jaundice and severe elevation of transaminases in 2015 with ERCP complicated by perforation and pancreatitis. No jaundice after that. \par Since than has mild elevation of transaminases and told had fatty liver. \par AST ALT 40s in March Fibrosan showed CAP score 350 S2 LS 7.9 kPa F0 -1\par US did not report steatosis\par Patient is  morbidly obese but does not have other components of metabolic syndrome\par A1c 5.4 LDL TG normal Jun 2021\par ASMA borderline. AMA Ig levels normal ceruloplasmin normal iron sat normal.\par Liver tests are now normal. \par \par \par Morbid obesity \par Has severe steatosis S2 on CAP score but no fibrosis on VCTE\par BMI 44\par Has tried diet and exercise for years with no success. \par Start semaglutide therapy 0.25 mg /weekly\par Counseled on MEN MTC and pancreatitis. Side effects nausea bloating flatulence\par \par Hepatic cyst\par Benign appearance. 2 cm left hepatic cyst\par \par Health maintenance\par HCV negative\par Hepatitis A not immune. Briefly discussed vaccination. Will discuss on follow up. Hep B - immune. \par Colonoscopy being planned when resp status improves

## 2022-06-04 ENCOUNTER — EMERGENCY (EMERGENCY)
Facility: HOSPITAL | Age: 47
LOS: 0 days | Discharge: HOME | End: 2022-06-04
Attending: STUDENT IN AN ORGANIZED HEALTH CARE EDUCATION/TRAINING PROGRAM | Admitting: STUDENT IN AN ORGANIZED HEALTH CARE EDUCATION/TRAINING PROGRAM
Payer: COMMERCIAL

## 2022-06-04 VITALS
OXYGEN SATURATION: 98 % | DIASTOLIC BLOOD PRESSURE: 88 MMHG | HEIGHT: 71 IN | TEMPERATURE: 97 F | HEART RATE: 76 BPM | SYSTOLIC BLOOD PRESSURE: 139 MMHG | RESPIRATION RATE: 18 BRPM

## 2022-06-04 DIAGNOSIS — R10.9 UNSPECIFIED ABDOMINAL PAIN: ICD-10-CM

## 2022-06-04 DIAGNOSIS — Z87.11 PERSONAL HISTORY OF PEPTIC ULCER DISEASE: ICD-10-CM

## 2022-06-04 DIAGNOSIS — E66.9 OBESITY, UNSPECIFIED: ICD-10-CM

## 2022-06-04 DIAGNOSIS — R19.7 DIARRHEA, UNSPECIFIED: ICD-10-CM

## 2022-06-04 DIAGNOSIS — E11.9 TYPE 2 DIABETES MELLITUS WITHOUT COMPLICATIONS: ICD-10-CM

## 2022-06-04 DIAGNOSIS — K25.1 ACUTE GASTRIC ULCER WITH PERFORATION: Chronic | ICD-10-CM

## 2022-06-04 DIAGNOSIS — Z88.0 ALLERGY STATUS TO PENICILLIN: ICD-10-CM

## 2022-06-04 DIAGNOSIS — Z87.19 PERSONAL HISTORY OF OTHER DISEASES OF THE DIGESTIVE SYSTEM: ICD-10-CM

## 2022-06-04 LAB
ALBUMIN SERPL ELPH-MCNC: 4.6 G/DL — SIGNIFICANT CHANGE UP (ref 3.5–5.2)
ALLERGY+IMMUNOLOGY DIAG STUDY NOTE: SIGNIFICANT CHANGE UP
ALP SERPL-CCNC: 86 U/L — SIGNIFICANT CHANGE UP (ref 30–115)
ALT FLD-CCNC: 22 U/L — SIGNIFICANT CHANGE UP (ref 0–41)
ANION GAP SERPL CALC-SCNC: 11 MMOL/L — SIGNIFICANT CHANGE UP (ref 7–14)
AST SERPL-CCNC: 29 U/L — SIGNIFICANT CHANGE UP (ref 0–41)
BASOPHILS # BLD AUTO: 0 K/UL — SIGNIFICANT CHANGE UP (ref 0–0.2)
BASOPHILS NFR BLD AUTO: 0 % — SIGNIFICANT CHANGE UP (ref 0–1)
BILIRUB DIRECT SERPL-MCNC: 0.2 MG/DL — SIGNIFICANT CHANGE UP (ref 0–0.3)
BILIRUB INDIRECT FLD-MCNC: 0.6 MG/DL — SIGNIFICANT CHANGE UP (ref 0.2–1.2)
BILIRUB SERPL-MCNC: 0.8 MG/DL — SIGNIFICANT CHANGE UP (ref 0.2–1.2)
BLD GP AB SCN SERPL QL: SIGNIFICANT CHANGE UP
BUN SERPL-MCNC: 21 MG/DL — HIGH (ref 10–20)
CALCIUM SERPL-MCNC: 9.5 MG/DL — SIGNIFICANT CHANGE UP (ref 8.5–10.1)
CHLORIDE SERPL-SCNC: 107 MMOL/L — SIGNIFICANT CHANGE UP (ref 98–110)
CO2 SERPL-SCNC: 23 MMOL/L — SIGNIFICANT CHANGE UP (ref 17–32)
CREAT SERPL-MCNC: 0.9 MG/DL — SIGNIFICANT CHANGE UP (ref 0.7–1.5)
DIR ANTIGLOB POLYSPECIFIC INTERPRETATION: SIGNIFICANT CHANGE UP
EGFR: 79 ML/MIN/1.73M2 — SIGNIFICANT CHANGE UP
EOSINOPHIL # BLD AUTO: 0 K/UL — SIGNIFICANT CHANGE UP (ref 0–0.7)
EOSINOPHIL NFR BLD AUTO: 0 % — SIGNIFICANT CHANGE UP (ref 0–8)
GIANT PLATELETS BLD QL SMEAR: PRESENT — SIGNIFICANT CHANGE UP
GLUCOSE SERPL-MCNC: 99 MG/DL — SIGNIFICANT CHANGE UP (ref 70–99)
HCG SERPL QL: NEGATIVE — SIGNIFICANT CHANGE UP
HCT VFR BLD CALC: 42.4 % — SIGNIFICANT CHANGE UP (ref 37–47)
HGB BLD-MCNC: 14.4 G/DL — SIGNIFICANT CHANGE UP (ref 12–16)
LACTATE SERPL-SCNC: 0.7 MMOL/L — SIGNIFICANT CHANGE UP (ref 0.7–2)
LIDOCAIN IGE QN: 18 U/L — SIGNIFICANT CHANGE UP (ref 7–60)
LYMPHOCYTES # BLD AUTO: 2 K/UL — SIGNIFICANT CHANGE UP (ref 1.2–3.4)
LYMPHOCYTES # BLD AUTO: 38.3 % — SIGNIFICANT CHANGE UP (ref 20.5–51.1)
MANUAL SMEAR VERIFICATION: SIGNIFICANT CHANGE UP
MCHC RBC-ENTMCNC: 29.9 PG — SIGNIFICANT CHANGE UP (ref 27–31)
MCHC RBC-ENTMCNC: 34 G/DL — SIGNIFICANT CHANGE UP (ref 32–37)
MCV RBC AUTO: 88 FL — SIGNIFICANT CHANGE UP (ref 81–99)
MONOCYTES # BLD AUTO: 0.41 K/UL — SIGNIFICANT CHANGE UP (ref 0.1–0.6)
MONOCYTES NFR BLD AUTO: 7.8 % — SIGNIFICANT CHANGE UP (ref 1.7–9.3)
NEUTROPHILS # BLD AUTO: 2.82 K/UL — SIGNIFICANT CHANGE UP (ref 1.4–6.5)
NEUTROPHILS NFR BLD AUTO: 53.9 % — SIGNIFICANT CHANGE UP (ref 42.2–75.2)
PLAT MORPH BLD: NORMAL — SIGNIFICANT CHANGE UP
PLATELET # BLD AUTO: 192 K/UL — SIGNIFICANT CHANGE UP (ref 130–400)
POTASSIUM SERPL-MCNC: 4.3 MMOL/L — SIGNIFICANT CHANGE UP (ref 3.5–5)
POTASSIUM SERPL-SCNC: 4.3 MMOL/L — SIGNIFICANT CHANGE UP (ref 3.5–5)
PROT SERPL-MCNC: 7.6 G/DL — SIGNIFICANT CHANGE UP (ref 6–8)
RBC # BLD: 4.82 M/UL — SIGNIFICANT CHANGE UP (ref 4.2–5.4)
RBC # FLD: 13.1 % — SIGNIFICANT CHANGE UP (ref 11.5–14.5)
RBC BLD AUTO: NORMAL — SIGNIFICANT CHANGE UP
SODIUM SERPL-SCNC: 141 MMOL/L — SIGNIFICANT CHANGE UP (ref 135–146)
WBC # BLD: 5.23 K/UL — SIGNIFICANT CHANGE UP (ref 4.8–10.8)
WBC # FLD AUTO: 5.23 K/UL — SIGNIFICANT CHANGE UP (ref 4.8–10.8)

## 2022-06-04 PROCEDURE — 99284 EMERGENCY DEPT VISIT MOD MDM: CPT

## 2022-06-04 PROCEDURE — 86077 PHYS BLOOD BANK SERV XMATCH: CPT

## 2022-06-04 RX ORDER — FAMOTIDINE 10 MG/ML
20 INJECTION INTRAVENOUS ONCE
Refills: 0 | Status: COMPLETED | OUTPATIENT
Start: 2022-06-04 | End: 2022-06-04

## 2022-06-04 RX ORDER — SODIUM CHLORIDE 9 MG/ML
1000 INJECTION, SOLUTION INTRAVENOUS ONCE
Refills: 0 | Status: COMPLETED | OUTPATIENT
Start: 2022-06-04 | End: 2022-06-04

## 2022-06-04 RX ADMIN — SODIUM CHLORIDE 1000 MILLILITER(S): 9 INJECTION, SOLUTION INTRAVENOUS at 09:44

## 2022-06-04 RX ADMIN — FAMOTIDINE 20 MILLIGRAM(S): 10 INJECTION INTRAVENOUS at 09:44

## 2022-06-04 NOTE — ED PROVIDER NOTE - OBJECTIVE STATEMENT
39-year-old female past medical history DM, gastric ulcer, p/w intermittent, watery diarrhea x4 days.  Did have some black stool during this time.  Has taken Pepto-Bismol during this time as well.  No chest pain, shortness of breath, dizziness.  No trauma.  + Crampy abdominal pain that has resolved.

## 2022-06-04 NOTE — ED PROVIDER NOTE - PHYSICAL EXAMINATION
See HPI Gen: NAD  Head: NCAT  ENT: MMM  Eyes: NL inspection  Neck: supple  Cardio: RRR  Pulm: No resp distress  Abd: S/NT no R/G  Rectal: Nontender, + brown stool, no black or red stool.  Chaperone DOMINGO Calabrese  Extrem: No pedal edema  Neuro: Grossly intact  Psyche: cooperative

## 2022-06-04 NOTE — ED PROVIDER NOTE - NS ED ROS FT
Constitutional:  See HPI  Eyes:  No visual changes  ENMT: No neck pain or stiffness  Cardiac:  No chest pain  Respiratory:  No cough or respiratory distress.   GI:  No nausea, vomiting, See HPI  :  No dysuria, frequency or burning.  MS:  No back pain.  Neuro:  No headache   Skin:  No skin rash

## 2022-06-04 NOTE — ED PROVIDER NOTE - NSFOLLOWUPINSTRUCTIONS_ED_ALL_ED_FT
You have been seen for diarrhea.  Your diarrhea is improving. Your blood tests are normal.  Stay well hydrated. Follow-up with your doctor in 3-5 days if symptoms are not improving.  For new or worsening symptoms, return to the ER.    Diarrhea    Diarrhea is frequent loose and watery bowel movements that has many causes. Diarrhea can make you feel weak and cause you to become dehydrated. Diarrhea typically lasts 3-5 days. However, it can last longer if it is a sign of something more serious. Drink clear fluids to prevent dehydration. Eat bland, easy-to-digest foods as tolerated.     SEEK IMMEDIATE MEDICAL CARE IF YOU HAVE THE FOLLOWING SYMPTOMS: fever, lightheadedness/dizziness, chest pain, black or bloody stools, shortness of breath, severe abdominal or back pain, or any signs of dehydration.

## 2022-06-04 NOTE — ED PROVIDER NOTE - PATIENT PORTAL LINK FT
You can access the FollowMyHealth Patient Portal offered by [facility] by registering at the following website: [email or web address]. By joining [facility]’s FollowMyHealth portal, you will also be able to view your health information using other applications (apps) compatible with our system.

## 2022-06-08 ENCOUNTER — APPOINTMENT (OUTPATIENT)
Age: 47
End: 2022-06-08
Payer: COMMERCIAL

## 2022-06-08 VITALS
DIASTOLIC BLOOD PRESSURE: 80 MMHG | HEIGHT: 71 IN | BODY MASS INDEX: 41.02 KG/M2 | HEART RATE: 105 BPM | OXYGEN SATURATION: 98 % | SYSTOLIC BLOOD PRESSURE: 112 MMHG | RESPIRATION RATE: 14 BRPM | WEIGHT: 293 LBS

## 2022-06-08 DIAGNOSIS — J45.998 OTHER ASTHMA: ICD-10-CM

## 2022-06-08 PROCEDURE — 99214 OFFICE O/P EST MOD 30 MIN: CPT

## 2022-06-08 NOTE — HISTORY OF PRESENT ILLNESS
[Doing Well] : doing well [Well Controlled] : Well controlled [Wheezing] : resolved wheezing [Chest Tightness Or Heavy Pressure] : resolved chest tightness [Checks Regularly] : The patient checks ~his/her~ peak flow regularly [Good Control] : peak flow has been good [None] : None [Adherent] : the patient is adherent with ~his/her~ medication regimen [Goals--Doing Well] : the patient is doing well with ~his/her~ asthma goals [PFTs] : pulmonary function tests [Follow-Up - Routine Clinic] : a routine clinic follow-up of [Currently Experiencing] : The patient is currently experiencing symptoms. [Cough] : resolved coughing [de-identified] : Post viral RADS  [Cough] : no cough

## 2022-06-08 NOTE — ASSESSMENT
[FreeTextEntry1] : Post viral RAD resolved \par Some bibasilar atelectasis and pleural nodularity on CT abdomen 8-21 to be followed \par At the present time from the pulmonary stand point, the patient is stable for her endoscopy

## 2022-06-08 NOTE — ED PROVIDER NOTE - CLINICAL SUMMARY MEDICAL DECISION MAKING FREE TEXT BOX
.    Patient with diarrhea.  Exam unremarkable.  Labs unremarkable.  Patient reassured.  Patient stable for discharge with care instructions, return precautions, PMD follow-up.  Patient understands plan and is comfortable.  DC home.      . Calm

## 2022-06-23 ENCOUNTER — RESULT REVIEW (OUTPATIENT)
Age: 47
End: 2022-06-23

## 2022-06-23 ENCOUNTER — OUTPATIENT (OUTPATIENT)
Dept: OUTPATIENT SERVICES | Facility: HOSPITAL | Age: 47
LOS: 1 days | Discharge: HOME | End: 2022-06-23

## 2022-06-23 DIAGNOSIS — K25.1 ACUTE GASTRIC ULCER WITH PERFORATION: Chronic | ICD-10-CM

## 2022-06-23 DIAGNOSIS — R91.8 OTHER NONSPECIFIC ABNORMAL FINDING OF LUNG FIELD: ICD-10-CM

## 2022-06-23 PROCEDURE — 71250 CT THORAX DX C-: CPT | Mod: 26

## 2022-07-13 ENCOUNTER — APPOINTMENT (OUTPATIENT)
Dept: GASTROENTEROLOGY | Facility: CLINIC | Age: 47
End: 2022-07-13

## 2022-07-13 VITALS
DIASTOLIC BLOOD PRESSURE: 90 MMHG | HEIGHT: 71 IN | SYSTOLIC BLOOD PRESSURE: 129 MMHG | WEIGHT: 293 LBS | BODY MASS INDEX: 41.02 KG/M2 | HEART RATE: 91 BPM

## 2022-07-13 PROCEDURE — 99214 OFFICE O/P EST MOD 30 MIN: CPT

## 2022-07-13 NOTE — REVIEW OF SYSTEMS
[Shortness Of Breath] : shortness of breath [Wheezing] : wheezing [Fever] : no fever [Chills] : no chills [Eye Pain] : no eye pain [Earache] : no earache [Chest Pain] : no chest pain [Palpitations] : no palpitations [Abdominal Pain] : no abdominal pain [Vomiting] : no vomiting [Constipation] : no constipation [Diarrhea] : no diarrhea [Joint Swelling] : no joint swelling [Muscle Weakness] : no muscle weakness [Easy Bleeding] : no tendency for easy bleeding [Easy Bruising] : no tendency for easy bruising

## 2022-07-13 NOTE — ASSESSMENT
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 \par Start semaglutide therapy 0.25 mg /weekly in May Counseled on MEN MTC and pancreatitis. Side effects nausea bloating flatulence\par Tolerating well. Lost 13 lb. \par Increase to 0.5 mg weekly\par \par Elevated transaminases\par Reports admission for jaundice and severe elevation of transaminases in 2015 with ERCP complicated by perforation and pancreatitis. No jaundice after that. \par Since than has mild elevation of transaminases and told had fatty liver. \par AST ALT 40s in March Fibrosan showed CAP score 350 S2 LS 7.9 kPa F0 -1\par US did not report steatosis\par Patient is  morbidly obese but does not have other components of metabolic syndrome\par A1c 5.4 LDL TG normal Jun 2021\par ASMA borderline. AMA Ig levels normal ceruloplasmin normal iron sat normal.\par Liver tests are now normal. \par \par Hepatic cyst\par Benign appearance. 2 cm left hepatic cyst\par \par Health maintenance\par HCV negative\par Hepatitis A not immune. Advised vaccination. \par  Hep B - immune. \par Colonoscopy being planned when resp status improves

## 2022-07-13 NOTE — PHYSICAL EXAM
[General Appearance - Alert] : alert [Sclera] : the sclera and conjunctiva were normal [Neck Cervical Mass (___cm)] : no neck mass was observed [Thyroid Diffuse Enlargement] : the thyroid was not enlarged [Auscultation Breath Sounds / Voice Sounds] : lungs were clear to auscultation bilaterally [Heart Sounds] : normal S1 and S2 [Murmurs] : no murmurs [Edema] : there was no peripheral edema [Nail Clubbing] : no clubbing  or cyanosis of the fingernails [Musculoskeletal - Swelling] : no joint swelling seen [] : no rash [No Focal Deficits] : no focal deficits [Oriented To Time, Place, And Person] : oriented to person, place, and time [Scleral Icterus] : No Scleral Icterus [Spider Angioma] : No spider angioma(s) were observed [Abdominal  Ascites] : no ascites [Splenomegaly] : no splenomegaly [Liver Palpable ___ Finger Breadths Below Costal Margin] : was not palpable below costal margin [Asterixis] : no asterixis observed [Jaundice] : No jaundice [FreeTextEntry1] : Obese

## 2022-07-13 NOTE — HISTORY OF PRESENT ILLNESS
[Body Piercing] : body piercing [Fatigue] : denies fatigue [Wt Gain ___ Lbs] : recent [unfilled] ~Upound(s) weight gain [Needlestick Exposure] : no needlestick exposure [Infected Sexual Partner] : no infected sexual partner [IV Drug Use] : no IV drug use [Tattoo] : no tattoos [Transfusion before 1992] : no transfusion before 1992 [Incarceration] : no incarceration [Alcohol Abuse] : no alcohol abuse [Autoimmune Disorder] : no autoimmune disorder [Cocaine Use] : no cocaine use [Wt Loss ___ Lbs] : no recent weight loss [de-identified] : Feels well. Started on Wegovy 0.25. Tolerating well. No nausea.  No abdominal pain. No jaundice. Losta bout 13 lb. Not exercising with heat and asthma but watching diet.  [de-identified] : Patient reports that in Dec 2015 she was extremely sick with elevated liver enzymes. She had severe fatigue and jaundice. Tests revealed AST ALT in 900s and she had ERCP done but was complicated by perforation and pancreatitis. Jaundice resolved after a prolonged hospital stay at [facility]. She has monitoring of liver tests every 6 months and AST ALT are in the 40s. She was 250 lb for many years prior and has been 275 in the past year. No Hx of HTN HLD DM. No hx of supplement use, herbal teas or antibiotics. Was on OC pills in her teens. \par Patient also states that she has a cyst on her liver.\par No abdominal pain jaundice itching confusion hematemesis or melena.\par Has been having cough and dyspnea since viral infection last month\par No liver disease or liver cancer in family.\par No family hx of any cancer including medullary cancer of thyroid or MEN\par Drinks 1 - 2 drinks per week.

## 2022-07-13 NOTE — HISTORY OF PRESENT ILLNESS
[Body Piercing] : body piercing [Fatigue] : denies fatigue [Wt Gain ___ Lbs] : recent [unfilled] ~Upound(s) weight gain [Needlestick Exposure] : no needlestick exposure [Infected Sexual Partner] : no infected sexual partner [IV Drug Use] : no IV drug use [Tattoo] : no tattoos [Transfusion before 1992] : no transfusion before 1992 [Incarceration] : no incarceration [Alcohol Abuse] : no alcohol abuse [Autoimmune Disorder] : no autoimmune disorder [Cocaine Use] : no cocaine use [Wt Loss ___ Lbs] : no recent weight loss [de-identified] : Feels well. Started on Wegovy 0.25. Tolerating well. No nausea.  No abdominal pain. No jaundice. Losta bout 13 lb. Not exercising with heat and asthma but watching diet.  [de-identified] : Patient reports that in Dec 2015 she was extremely sick with elevated liver enzymes. She had severe fatigue and jaundice. Tests revealed AST ALT in 900s and she had ERCP done but was complicated by perforation and pancreatitis. Jaundice resolved after a prolonged hospital stay at [facility]. She has monitoring of liver tests every 6 months and AST ALT are in the 40s. She was 250 lb for many years prior and has been 275 in the past year. No Hx of HTN HLD DM. No hx of supplement use, herbal teas or antibiotics. Was on OC pills in her teens. \par Patient also states that she has a cyst on her liver.\par No abdominal pain jaundice itching confusion hematemesis or melena.\par Has been having cough and dyspnea since viral infection last month\par No liver disease or liver cancer in family.\par No family hx of any cancer including medullary cancer of thyroid or MEN\par Drinks 1 - 2 drinks per week.

## 2022-07-25 LAB
ALBUMIN SERPL ELPH-MCNC: 4.5 G/DL
ALP BLD-CCNC: 91 U/L
ALT SERPL-CCNC: 21 U/L
ANION GAP SERPL CALC-SCNC: 11 MMOL/L
AST SERPL-CCNC: 28 U/L
BASOPHILS # BLD AUTO: 0.03 K/UL
BASOPHILS NFR BLD AUTO: 0.6 %
BILIRUB SERPL-MCNC: 0.5 MG/DL
BUN SERPL-MCNC: 17 MG/DL
CALCIUM SERPL-MCNC: 9.1 MG/DL
CHLORIDE SERPL-SCNC: 103 MMOL/L
CO2 SERPL-SCNC: 25 MMOL/L
CREAT SERPL-MCNC: 0.9 MG/DL
EGFR: 79 ML/MIN/1.73M2
EOSINOPHIL # BLD AUTO: 0.02 K/UL
EOSINOPHIL NFR BLD AUTO: 0.4 %
GLUCOSE SERPL-MCNC: 92 MG/DL
HCT VFR BLD CALC: 43.1 %
HGB BLD-MCNC: 14.2 G/DL
IMM GRANULOCYTES NFR BLD AUTO: 0.4 %
LYMPHOCYTES # BLD AUTO: 2.74 K/UL
LYMPHOCYTES NFR BLD AUTO: 52.2 %
MAN DIFF?: NORMAL
MCHC RBC-ENTMCNC: 30 PG
MCHC RBC-ENTMCNC: 32.9 G/DL
MCV RBC AUTO: 91.1 FL
MONOCYTES # BLD AUTO: 0.62 K/UL
MONOCYTES NFR BLD AUTO: 11.8 %
NEUTROPHILS # BLD AUTO: 1.82 K/UL
NEUTROPHILS NFR BLD AUTO: 34.6 %
PLATELET # BLD AUTO: 191 K/UL
POTASSIUM SERPL-SCNC: 4.7 MMOL/L
PROT SERPL-MCNC: 7.3 G/DL
RBC # BLD: 4.73 M/UL
RBC # FLD: 13.2 %
SODIUM SERPL-SCNC: 139 MMOL/L
WBC # FLD AUTO: 5.25 K/UL

## 2022-08-10 ENCOUNTER — APPOINTMENT (OUTPATIENT)
Age: 47
End: 2022-08-10

## 2022-08-10 DIAGNOSIS — R93.89 ABNORMAL FINDINGS ON DIAGNOSTIC IMAGING OF OTHER SPECIFIED BODY STRUCTURES: ICD-10-CM

## 2022-08-10 PROCEDURE — 99442: CPT | Mod: 95

## 2022-08-10 RX ORDER — FLUTICASONE FUROATE AND VILANTEROL TRIFENATATE 200; 25 UG/1; UG/1
200-25 POWDER RESPIRATORY (INHALATION)
Qty: 1 | Refills: 3 | Status: ACTIVE | COMMUNITY
Start: 2022-04-20 | End: 1900-01-01

## 2022-08-10 NOTE — HISTORY OF PRESENT ILLNESS
[Home] : at home, [unfilled] , at the time of the visit. [Medical Office: ([facility])___] : at the medical office located in  [Verbal consent obtained from patient] : the patient, [unfilled] [Intermittent] : intermittent [Doing Well] : doing well [Well Controlled] : Well controlled [Wheezing] : resolved wheezing [Cough] : resolved coughing [Chest Tightness Or Heavy Pressure] : resolved chest tightness [Checks Regularly] : The patient checks ~his/her~ peak flow regularly [Good Control] : peak flow has been good [None] : None [Adherent] : the patient is adherent with ~his/her~ medication regimen [Goals--Doing Well] : the patient is doing well with ~his/her~ asthma goals [PFTs] : pulmonary function tests [Follow-Up - Routine Clinic] : a routine clinic follow-up of [Cough] : no cough [Currently Experiencing] : The patient is currently experiencing symptoms.

## 2022-08-10 NOTE — ASSESSMENT
[FreeTextEntry1] : Post viral RAD resolved \par Probable intermittent asthma \par Some bibasilar atelectasis and pleural nodularity on CT abdomen 8-21 improved on CT chest \par Scheduled for her endoscopy in September

## 2022-09-06 ENCOUNTER — APPOINTMENT (OUTPATIENT)
Dept: OBGYN | Facility: CLINIC | Age: 47
End: 2022-09-06

## 2022-09-06 VITALS
SYSTOLIC BLOOD PRESSURE: 106 MMHG | BODY MASS INDEX: 39.2 KG/M2 | HEART RATE: 86 BPM | DIASTOLIC BLOOD PRESSURE: 74 MMHG | TEMPERATURE: 97.3 F | WEIGHT: 280 LBS | HEIGHT: 71 IN

## 2022-09-06 DIAGNOSIS — K64.9 UNSPECIFIED HEMORRHOIDS: ICD-10-CM

## 2022-09-06 PROCEDURE — 99396 PREV VISIT EST AGE 40-64: CPT

## 2022-09-07 NOTE — HISTORY OF PRESENT ILLNESS
[Patient reported mammogram was normal] : Patient reported mammogram was normal [Patient reported PAP Smear was normal] : Patient reported PAP Smear was normal [Y] : Positive pregnancy history [TextBox_4] : GYNHX\par No history of fibroids, cysts, or STDs\par History of abnormal Pap smear in the past\par Last pap 2020\par Patient has Mirena No. 3, has no bleed [Mammogramdate] : 2020 [PapSmeardate] : 2020 [ColonoscopyDate] : NEVER  [TextBox_43] : schedule in November  [PGHxTotal] : 2 [[facility]] : 2 [[facility]] : 2 [FreeTextEntry1] :

## 2022-09-07 NOTE — PHYSICAL EXAM
[Appropriately responsive] : appropriately responsive [Alert] : alert [No Acute Distress] : no acute distress [No Lymphadenopathy] : no lymphadenopathy [Regular Rate Rhythm] : regular rate rhythm [No Murmurs] : no murmurs [Clear to Auscultation B/L] : clear to auscultation bilaterally [Soft] : soft [Non-tender] : non-tender [Non-distended] : non-distended [No HSM] : No HSM [No Lesions] : no lesions [No Mass] : no mass [Oriented x3] : oriented x3 [Examination Of The Breasts] : a normal appearance [No Discharge] : no discharge [No Masses] : no breast masses were palpable [Labia Majora] : normal [Labia Minora] : normal [No Bleeding] : There was no active vaginal bleeding [Normal] : normal [Uterine Adnexae] : normal [External Hemorrhoid] : external hemorrhoid [FreeTextEntry6] : WNL

## 2022-09-07 NOTE — DISCUSSION/SUMMARY
[FreeTextEntry1] : 47-year-old para 2 for annual exam, hemorrhoids\par Pap HPV\par Mammogram\par Colonoscopy advised\par Proctosone cream

## 2022-09-08 LAB — HPV HIGH+LOW RISK DNA PNL CVX: NOT DETECTED

## 2022-09-13 ENCOUNTER — APPOINTMENT (OUTPATIENT)
Dept: ORTHOPEDIC SURGERY | Facility: CLINIC | Age: 47
End: 2022-09-13

## 2022-09-13 VITALS — WEIGHT: 280 LBS | BODY MASS INDEX: 39.2 KG/M2 | HEIGHT: 71 IN

## 2022-09-13 PROCEDURE — 99072 ADDL SUPL MATRL&STAF TM PHE: CPT

## 2022-09-13 PROCEDURE — 99213 OFFICE O/P EST LOW 20 MIN: CPT

## 2022-09-13 NOTE — ASSESSMENT
[FreeTextEntry1] :  Patient has signs and symptoms consistent with a TFCC tear of the right wrist.  She does have a history of a TFCC tear.  She was doing well until this more recent injury.  We are requesting an MRI of the right wrist for further evaluation.  There is contemplation for observation cortisone injection or perhaps even surgical intervention like arthroscopy.

## 2022-09-13 NOTE — HISTORY OF PRESENT ILLNESS
[de-identified] : 47-year-old female with continued right-sided wrist pain discomfort.  She is also getting numbness and tingling to the fingers.  But the bigger concern is pain discomfort on the ulnar side of the wrist.  She is wearing a wrist widget brace in still having pain discomfort.  It bothers her even when she is typing.  She is working with a mild partial temporary disability.

## 2022-09-13 NOTE — DATA REVIEWED
[FreeTextEntry1] :   Her previous radiographs reviewed that show no fracture no dislocation but they do show a ulnar neutral position

## 2022-09-13 NOTE — PHYSICAL EXAM
[de-identified] :   Patient does have positive Tinel's median nerve compression test on the right side.  She has tenderness to palpation on the ulnar side of the wrist.  She has normal capillary refill.  Good thenar strength without thenar atrophy.  No instability at the DRUJ.

## 2022-09-16 LAB — CYTOLOGY CVX/VAG DOC THIN PREP: NORMAL

## 2022-09-17 ENCOUNTER — LABORATORY RESULT (OUTPATIENT)
Age: 47
End: 2022-09-17

## 2022-09-20 ENCOUNTER — RESULT REVIEW (OUTPATIENT)
Age: 47
End: 2022-09-20

## 2022-09-20 ENCOUNTER — OUTPATIENT (OUTPATIENT)
Dept: OUTPATIENT SERVICES | Facility: HOSPITAL | Age: 47
LOS: 1 days | Discharge: HOME | End: 2022-09-20

## 2022-09-20 ENCOUNTER — TRANSCRIPTION ENCOUNTER (OUTPATIENT)
Age: 47
End: 2022-09-20

## 2022-09-20 VITALS
SYSTOLIC BLOOD PRESSURE: 122 MMHG | RESPIRATION RATE: 18 BRPM | HEIGHT: 71 IN | DIASTOLIC BLOOD PRESSURE: 77 MMHG | WEIGHT: 270.07 LBS | TEMPERATURE: 97 F | HEART RATE: 77 BPM

## 2022-09-20 VITALS
TEMPERATURE: 97 F | HEART RATE: 65 BPM | SYSTOLIC BLOOD PRESSURE: 123 MMHG | RESPIRATION RATE: 23 BRPM | DIASTOLIC BLOOD PRESSURE: 87 MMHG | OXYGEN SATURATION: 99 %

## 2022-09-20 DIAGNOSIS — K25.1 ACUTE GASTRIC ULCER WITH PERFORATION: Chronic | ICD-10-CM

## 2022-09-20 PROCEDURE — 88312 SPECIAL STAINS GROUP 1: CPT | Mod: 26

## 2022-09-20 PROCEDURE — 45385 COLONOSCOPY W/LESION REMOVAL: CPT

## 2022-09-20 PROCEDURE — 88305 TISSUE EXAM BY PATHOLOGIST: CPT | Mod: 26

## 2022-09-20 PROCEDURE — 43239 EGD BIOPSY SINGLE/MULTIPLE: CPT | Mod: XS

## 2022-09-20 PROCEDURE — 88313 SPECIAL STAINS GROUP 2: CPT | Mod: 26

## 2022-09-20 PROCEDURE — 45380 COLONOSCOPY AND BIOPSY: CPT | Mod: XU

## 2022-09-20 RX ORDER — OMEPRAZOLE 10 MG/1
1 CAPSULE, DELAYED RELEASE ORAL
Qty: 60 | Refills: 0
Start: 2022-09-20 | End: 2022-10-19

## 2022-09-20 RX ORDER — IBUPROFEN 200 MG
1 TABLET ORAL
Qty: 0 | Refills: 0 | DISCHARGE

## 2022-09-20 RX ORDER — LANSOPRAZOLE 15 MG/1
1 CAPSULE, DELAYED RELEASE ORAL
Qty: 0 | Refills: 0 | DISCHARGE

## 2022-09-20 NOTE — H&P PST ADULT - HISTORY OF PRESENT ILLNESS
47-year-old female morbidly obese history of Tavarez's esophagus hx of complicated ERCP a few years ago [facility], liver cyst is here for EGD And colonoscopy for acid reflux and screening   Denies family history of colon cancer, gastric cancer.

## 2022-09-20 NOTE — ASU DISCHARGE PLAN (ADULT/PEDIATRIC) - CARE PROVIDER_API CALL
Angela Alcocer)  Gastroenterology; Internal Medicine  [address] [place] [id number]  Phone: [phone]  Fax: [phone]  Follow Up Time:

## 2022-09-20 NOTE — ASU PATIENT PROFILE, ADULT - FALL HARM RISK - UNIVERSAL INTERVENTIONS
Bed in lowest position, wheels locked, appropriate side rails in place/Call bell, personal items and telephone in reach/Instruct patient to call for assistance before getting out of bed or chair/Non-slip footwear when patient is out of bed/[place] to call system/Physically safe environment - no spills, clutter or unnecessary equipment/Purposeful Proactive Rounding/Room/bathroom lighting operational, light cord in reach

## 2022-09-20 NOTE — ASU DISCHARGE PLAN (ADULT/PEDIATRIC) - NS MD DC FALL RISK RISK
For information on Fall & Injury Prevention, visit: [email or web address] OR  [email or web address] OR  https://www.cdc.gov/steadi/patient.html

## 2022-09-22 LAB
SURGICAL PATHOLOGY STUDY: SIGNIFICANT CHANGE UP
SURGICAL PATHOLOGY STUDY: SIGNIFICANT CHANGE UP

## 2022-09-23 DIAGNOSIS — K22.70 BARRETT'S ESOPHAGUS WITHOUT DYSPLASIA: ICD-10-CM

## 2022-09-23 DIAGNOSIS — D12.3 BENIGN NEOPLASM OF TRANSVERSE COLON: ICD-10-CM

## 2022-09-23 DIAGNOSIS — Z12.11 ENCOUNTER FOR SCREENING FOR MALIGNANT NEOPLASM OF COLON: ICD-10-CM

## 2022-09-23 DIAGNOSIS — K29.50 UNSPECIFIED CHRONIC GASTRITIS WITHOUT BLEEDING: ICD-10-CM

## 2022-09-23 DIAGNOSIS — K64.4 RESIDUAL HEMORRHOIDAL SKIN TAGS: ICD-10-CM

## 2022-09-23 DIAGNOSIS — K62.1 RECTAL POLYP: ICD-10-CM

## 2022-09-23 DIAGNOSIS — K44.9 DIAPHRAGMATIC HERNIA WITHOUT OBSTRUCTION OR GANGRENE: ICD-10-CM

## 2022-10-06 ENCOUNTER — NON-APPOINTMENT (OUTPATIENT)
Age: 47
End: 2022-10-06

## 2022-10-17 ENCOUNTER — APPOINTMENT (OUTPATIENT)
Dept: ORTHOPEDIC SURGERY | Facility: CLINIC | Age: 47
End: 2022-10-17

## 2022-10-17 VITALS — HEIGHT: 71 IN | WEIGHT: 280 LBS | BODY MASS INDEX: 39.2 KG/M2

## 2022-10-17 DIAGNOSIS — S69.81XD OTHER SPECIFIED INJURIES OF RIGHT WRIST, HAND AND FINGER(S), SUBSEQUENT ENCOUNTER: ICD-10-CM

## 2022-10-17 PROCEDURE — 20605 DRAIN/INJ JOINT/BURSA W/O US: CPT | Mod: RT

## 2022-10-17 PROCEDURE — 99072 ADDL SUPL MATRL&STAF TM PHE: CPT

## 2022-10-17 PROCEDURE — 99213 OFFICE O/P EST LOW 20 MIN: CPT | Mod: 25

## 2022-10-17 NOTE — PROCEDURE
[FreeTextEntry3] : Wrist injection was performed because of pain inflammation and stiffness\par Anesthesia: ethyl chloride sprayed topically\par Dexamethasone: An injection of Dexamethasone 1cc  4mg/ml\par Lidocaine: An injection of Lidocaine 1% 1cc\par \par Patient has tried OTC's including aspirin, Ibuprofen, Aleve etc or prescription NSAIDS, and/or exercises at home and/ or\par physical therapy without satisfactory response.\par After verbal consent using sterile preparation and technique. The risks, benefits, and alternatives to cortisone injection\par were explained in full to the patient. Risks outlined include but are not limited to infection, sepsis, bleeding, scarring, skin\par discoloration, temporary increase in pain, syncopal episode, failure to resolve symptoms, allergic reaction, symptom\par recurrence, and elevation of blood sugar in diabetics. Patient understood the risks. All questions were answered. After\par discussion of options, patient requested an injection. Oral informed consent was obtained and sterile prep was done of the\par injection site. Sterile technique was utilized for the procedure including the preparation of the solutions used for the\par injection. Patient tolerated the procedure well. Advised to ice the injection site this evening.\par Prep with ETOH locally to site. Sterile technique used\par   Right wrist

## 2022-10-17 NOTE — ASSESSMENT
[FreeTextEntry1] :  Patient is having right-sided wrist pain discomfort that is an area consistent with a TFCC tear she is agreeable to a cortisone injection which gives her today.  We will see how this injection does she continues use her brace id she continues to work.

## 2022-10-17 NOTE — PHYSICAL EXAM
[de-identified] :   Patient has tenderness to palpation on the ulnar side of the wrist she has good supination pronation.  There is no instability about the DRUJ.  There is no erythema ecchymoses or abrasions

## 2022-10-19 ENCOUNTER — RESULT REVIEW (OUTPATIENT)
Age: 47
End: 2022-10-19

## 2022-10-19 ENCOUNTER — OUTPATIENT (OUTPATIENT)
Dept: OUTPATIENT SERVICES | Facility: HOSPITAL | Age: 47
LOS: 1 days | Discharge: HOME | End: 2022-10-19

## 2022-10-19 DIAGNOSIS — K25.1 ACUTE GASTRIC ULCER WITH PERFORATION: Chronic | ICD-10-CM

## 2022-10-19 DIAGNOSIS — Z12.31 ENCOUNTER FOR SCREENING MAMMOGRAM FOR MALIGNANT NEOPLASM OF BREAST: ICD-10-CM

## 2022-10-19 PROCEDURE — 77063 BREAST TOMOSYNTHESIS BI: CPT | Mod: 26

## 2022-10-19 PROCEDURE — 77067 SCR MAMMO BI INCL CAD: CPT | Mod: 26

## 2022-10-24 ENCOUNTER — APPOINTMENT (OUTPATIENT)
Dept: GASTROENTEROLOGY | Facility: CLINIC | Age: 47
End: 2022-10-24
Payer: COMMERCIAL

## 2022-10-24 PROCEDURE — 99443: CPT

## 2022-10-24 NOTE — HISTORY OF PRESENT ILLNESS
[Home] : at home, [unfilled] , at the time of the visit. [Medical Office: ([facility])___] : at the medical office located in  [Verbal consent obtained from patient] : the patient, [unfilled] [FreeTextEntry4] : [place] [de-identified] : 09/20/22 [FreeTextEntry1] : 09/20/22 [de-identified] : 47-year-old female p following up after EGD (Tavarez's GERD)and colonoscopy (CRC screening)\par GERD\par Tavarez's- No dysplasia\par History of abnormal LFTs -NAFLD\par History of ERCP-complicated?  Perf (reportedly)\par Morbid obesity\par History of liver cyst\par SSA-8mm [de-identified] : 47-year-old female self-referred for a new visit office.  Previously scheduled to see Dr. Hoffman for Tavarez's esophagus.\par The patient is morbidly obese reports a history of Tavarez's esophagus change her GI due to a change in her insurance plan.  Patient states he had a complicated ERCP a few years ago [facility].  Also states that she has a liver cyst which is being monitored by yearly ultrasounds.  She also mentions that at some point her LFTs were highly elevated to as high as 900s.  Currently her only complaint is acid reflux when she does not take her omeprazole 40 mg once daily which she has run out of.  Denies family history of colon cancer, gastric cancer.

## 2022-10-24 NOTE — ASSESSMENT
[FreeTextEntry1] : 47-year-old female p following up after EGD (Tavarez's GERD)and colonoscopy (CRC screening)\par GERD\par Tavarez's- No dysplasia\par History of abnormal LFTs -NAFLD\par History of ERCP-complicated?  Perf (reportedly)\par Morbid obesity\par History of liver cyst\par SSA-8mm\par \par Plan:\par Repeat EGD in 2-3 years\par Colonosocpy in 5 years\par Weight loss\par Discussed Bariatrics- Interested in endoscopic intervention if available

## 2022-10-26 ENCOUNTER — APPOINTMENT (OUTPATIENT)
Dept: GASTROENTEROLOGY | Facility: CLINIC | Age: 47
End: 2022-10-26

## 2022-10-26 VITALS
SYSTOLIC BLOOD PRESSURE: 130 MMHG | OXYGEN SATURATION: 98 % | DIASTOLIC BLOOD PRESSURE: 80 MMHG | HEART RATE: 88 BPM | BODY MASS INDEX: 40.6 KG/M2 | WEIGHT: 290 LBS | HEIGHT: 71 IN

## 2022-10-26 PROCEDURE — 99214 OFFICE O/P EST MOD 30 MIN: CPT

## 2022-10-26 NOTE — ASSESSMENT
[FreeTextEntry1] : 47 year old  female with morbid obesity hepatic steatosis Tavarez's  seen for elevated transaminases\par \par Morbid obesity with NAFLD\par Has severe steatosis  S2 score and no fibrosis on VCTE\par BMI 44 initially with wt 306 lb \par Has tried diet and exercise for years with no success. \par Started semaglutide therapy 0.25 mg /weekly in May 2022. \par  Counseled on MEN MTC and pancreatitis. Side effects nausea bloating flatulence\par Increased dose to 0.5 mg weekly in July 2022. \par Tolerating well. Lost 16 lb total but not lately. Will increase dose to 1 mg weekly. \par Prefers slow increase in dose in 3 months\par \par Elevated transaminases\par Reports admission for jaundice and severe elevation of transaminases in 2015 with ERCP complicated by perforation and pancreatitis. No jaundice after that. \par Since than has mild elevation of transaminases and told had fatty liver. \par AST ALT 40s in March Fibrosan showed CAP score 350 S2 LS 7.9 kPa F0 -1\par US did not report steatosis\par Patient is  morbidly obese but does not have other components of metabolic syndrome\par A1c 5.4 LDL TG normal Jun 2021\par ASMA borderline. AMA Ig levels normal ceruloplasmin normal iron sat normal.\par Liver tests remain normal. \par \par Hepatic cyst\par Benign appearance. 2 cm left hepatic cyst\par \par Health maintenance\par HCV negative\par Hepatitis A not immune. not done. Will reorder vaccination. \par  Hep B - immune. \par Colonoscopy done in Sept 2022\par \par Follow up in  3 months.

## 2022-10-26 NOTE — PHYSICAL EXAM
[General Appearance - Alert] : alert [Sclera] : the sclera and conjunctiva were normal [Outer Ear] : the ears and nose were normal in appearance [Neck Cervical Mass (___cm)] : no neck mass was observed [Thyroid Diffuse Enlargement] : the thyroid was not enlarged [Auscultation Breath Sounds / Voice Sounds] : lungs were clear to auscultation bilaterally [Heart Sounds] : normal S1 and S2 [Murmurs] : no murmurs [Edema] : there was no peripheral edema [Abdomen Soft] : soft [Abdomen Tenderness] : non-tender [Nail Clubbing] : no clubbing  or cyanosis of the fingernails [Musculoskeletal - Swelling] : no joint swelling seen [] : no rash [No Focal Deficits] : no focal deficits [Oriented To Time, Place, And Person] : oriented to person, place, and time [Scleral Icterus] : No Scleral Icterus [Spider Angioma] : No spider angioma(s) were observed [Abdominal  Ascites] : no ascites [Splenomegaly] : no splenomegaly [Liver Palpable ___ Finger Breadths Below Costal Margin] : was not palpable below costal margin [Asterixis] : no asterixis observed [Jaundice] : No jaundice [FreeTextEntry1] : Obese

## 2022-10-26 NOTE — HISTORY OF PRESENT ILLNESS
[Changing Medication Dose ___] : changing the dose of [unfilled] [Body Piercing] : body piercing [Fatigue] : denies fatigue [Wt Gain ___ Lbs] : recent [unfilled] ~Upound(s) weight gain [Needlestick Exposure] : no needlestick exposure [Infected Sexual Partner] : no infected sexual partner [IV Drug Use] : no IV drug use [Tattoo] : no tattoos [Transfusion before 1992] : no transfusion before 1992 [Incarceration] : no incarceration [Alcohol Abuse] : no alcohol abuse [Autoimmune Disorder] : no autoimmune disorder [Cocaine Use] : no cocaine use [Wt Loss ___ Lbs] : no recent weight loss [de-identified] : Feels well. No complaints. No abdominal pain nausea or vomiting.  No thyroid issues. Not losing wt now. Has not been careful with diet. Has lost 16 lb since starting medication in May.  [de-identified] : Patient reports that in Dec 2015 she was extremely sick with elevated liver enzymes. She had severe fatigue and jaundice. Tests revealed AST ALT in 900s and she had ERCP done but was complicated by perforation and pancreatitis. Jaundice resolved after a prolonged hospital stay at [facility]. She has monitoring of liver tests every 6 months and AST ALT are in the 40s. She was 250 lb for many years prior and has been 275 in the past year. No Hx of HTN HLD DM. No hx of supplement use, herbal teas or antibiotics. Was on OC pills in her teens. \par Patient also states that she has a cyst on her liver.\par No abdominal pain jaundice itching confusion hematemesis or melena.\par Has been having cough and dyspnea since viral infection last month\par No liver disease or liver cancer in family.\par No family hx of any cancer including medullary cancer of thyroid or MEN\par Drinks 1 - 2 drinks per week.

## 2022-11-01 ENCOUNTER — APPOINTMENT (OUTPATIENT)
Dept: ORTHOPEDIC SURGERY | Facility: CLINIC | Age: 47
End: 2022-11-01

## 2022-11-28 ENCOUNTER — APPOINTMENT (OUTPATIENT)
Dept: ORTHOPEDIC SURGERY | Facility: CLINIC | Age: 47
End: 2022-11-28

## 2023-02-07 ENCOUNTER — TRANSCRIPTION ENCOUNTER (OUTPATIENT)
Age: 48
End: 2023-02-07

## 2023-02-22 ENCOUNTER — APPOINTMENT (OUTPATIENT)
Dept: GASTROENTEROLOGY | Facility: CLINIC | Age: 48
End: 2023-02-22

## 2023-04-24 ENCOUNTER — APPOINTMENT (OUTPATIENT)
Dept: GASTROENTEROLOGY | Facility: CLINIC | Age: 48
End: 2023-04-24
Payer: COMMERCIAL

## 2023-04-24 PROCEDURE — 99213 OFFICE O/P EST LOW 20 MIN: CPT

## 2023-04-24 NOTE — ASSESSMENT
[FreeTextEntry1] : GERD\par Tavarez's\par Large hiatal hernia\par LRA\par NAFLD\par \par Plan:\par Continue weight loss\par FU with hepatology\par PPI BID\par Follow up in 3 months\par next EGD in 3 years\par Next colonoscopy in 5 years

## 2023-04-24 NOTE — PHYSICAL EXAM

## 2023-04-24 NOTE — HISTORY OF PRESENT ILLNESS
[FreeTextEntry1] : 48-year-old female self-referred for a follow up.\par Hx of garcía's (short segment without dysplasia) hiatal hernia. Hx of LRA on colonoscopy.\par Currently complaining of GERD specially at bed time.\par Endorses 25lbs weight loss on ozempic.\par takes omeprazole 40 PRN\par

## 2023-05-06 ENCOUNTER — LABORATORY RESULT (OUTPATIENT)
Age: 48
End: 2023-05-06

## 2023-05-08 LAB
ALBUMIN SERPL ELPH-MCNC: 4.4 G/DL
ALP BLD-CCNC: 96 U/L
ALT SERPL-CCNC: 21 U/L
ANION GAP SERPL CALC-SCNC: 12 MMOL/L
AST SERPL-CCNC: 27 U/L
BASOPHILS # BLD AUTO: 0.07 K/UL
BASOPHILS NFR BLD AUTO: 1.1 %
BILIRUB SERPL-MCNC: 0.6 MG/DL
BUN SERPL-MCNC: 18 MG/DL
CALCIUM SERPL-MCNC: 8.9 MG/DL
CHLORIDE SERPL-SCNC: 105 MMOL/L
CO2 SERPL-SCNC: 24 MMOL/L
CREAT SERPL-MCNC: 0.9 MG/DL
EGFR: 78 ML/MIN/1.73M2
EOSINOPHIL # BLD AUTO: 0.06 K/UL
EOSINOPHIL NFR BLD AUTO: 1 %
ESTIMATED AVERAGE GLUCOSE: 111 MG/DL
GLUCOSE SERPL-MCNC: 88 MG/DL
HBA1C MFR BLD HPLC: 5.5 %
HCT VFR BLD CALC: 42.3 %
HGB BLD-MCNC: 13.8 G/DL
IMM GRANULOCYTES NFR BLD AUTO: 0.5 %
LYMPHOCYTES # BLD AUTO: 3.01 K/UL
LYMPHOCYTES NFR BLD AUTO: 48.5 %
MAN DIFF?: NORMAL
MCHC RBC-ENTMCNC: 30.1 PG
MCHC RBC-ENTMCNC: 32.6 G/DL
MCV RBC AUTO: 92.2 FL
MONOCYTES # BLD AUTO: 0.54 K/UL
MONOCYTES NFR BLD AUTO: 8.7 %
NEUTROPHILS # BLD AUTO: 2.5 K/UL
NEUTROPHILS NFR BLD AUTO: 40.2 %
PLATELET # BLD AUTO: 247 K/UL
POTASSIUM SERPL-SCNC: 4.7 MMOL/L
PROT SERPL-MCNC: 7.2 G/DL
RBC # BLD: 4.59 M/UL
RBC # FLD: 13.4 %
SODIUM SERPL-SCNC: 141 MMOL/L
WBC # FLD AUTO: 6.21 K/UL

## 2023-07-24 ENCOUNTER — APPOINTMENT (OUTPATIENT)
Dept: GASTROENTEROLOGY | Facility: CLINIC | Age: 48
End: 2023-07-24
Payer: COMMERCIAL

## 2023-07-24 PROCEDURE — 99214 OFFICE O/P EST MOD 30 MIN: CPT

## 2023-07-24 NOTE — ASSESSMENT
[FreeTextEntry1] : GERD on PPI BID but still experiences breakthrough symptoms at night\par Tavarez's\par Large hiatal hernia\par LRA\par NAFLD\par \par Plan:\par Continue weight loss- on Wegovy lost 25lbs but plateaued \par FU with hepatology\par PPI BID\par next EGD in 3 years\par Next colonoscopy in 5 years\par Ready to discuss hiatal hernia repair surgery- will schedule consultation

## 2023-07-24 NOTE — HISTORY OF PRESENT ILLNESS
[FreeTextEntry1] : 48-year-old female self-referred for a follow up.\par Hx of garcía's (short segment without dysplasia) hiatal hernia. Hx of LRA on colonoscopy.\par Currently complaining of GERD specially at bed time.\par Endorses 25lbs weight loss on Wegovy.\par takes omeprazole 40 PRN\par

## 2023-07-24 NOTE — REVIEW OF SYSTEMS
[As Noted in HPI] : as noted in HPI [Abdominal Pain] : abdominal pain [Vomiting] : vomiting [Heartburn] : heartburn [Bloating (gassiness)] : bloating [Negative] : Heme/Lymph [Constipation] : no constipation [Diarrhea] : no diarrhea [Melena (black stool)] : no melena [Bleeding] : no bleeding [Fecal Incontinence (soiling)] : no fecal incontinence

## 2023-07-24 NOTE — PHYSICAL EXAM
[Alert] : alert [Normal Voice/Communication] : normal voice/communication [Obese (BMI >= 30)] : obese (BMI >= 30) [Sclera] : the sclera and conjunctiva were normal [None] : no edema [Normal] : normal bowel sounds, non-tender, no masses, soft, no no hepato-splenomegaly [Abnormal Walk] : normal gait [Normal Color / Pigmentation] : normal skin color and pigmentation [Oriented To Time, Place, And Person] : oriented to person, place, and time [Healthy Appearing] : healthy appearing [No Acute Distress] : no acute distress [Hearing Threshold Finger Rub Not [place]] : hearing was normal [Normal Lips/Gums] : the lips and gums were normal [Oropharynx] : the oropharynx was normal [Normal Appearance] : the appearance of the neck was normal [No Neck Mass] : no neck mass was observed [No Respiratory Distress] : no respiratory distress [No Acc Muscle Use] : no accessory muscle use [Respiration, Rhythm And Depth] : normal respiratory rhythm and effort [Auscultation Breath Sounds / Voice Sounds] : lungs were clear to auscultation bilaterally [Heart Rate And Rhythm] : heart rate was normal and rhythm regular [Normal S1, S2] : normal S1 and S2 [Murmurs] : no murmurs [Bowel Sounds] : normal bowel sounds [Abdomen Tenderness] : non-tender [No Masses] : no abdominal mass palpated [Abdomen Soft] : soft [] : no hepatosplenomegaly

## 2023-07-28 ENCOUNTER — APPOINTMENT (OUTPATIENT)
Dept: CARDIOTHORACIC SURGERY | Facility: HOSPITAL | Age: 48
End: 2023-07-28

## 2023-08-01 ENCOUNTER — APPOINTMENT (OUTPATIENT)
Dept: CARDIOTHORACIC SURGERY | Facility: CLINIC | Age: 48
End: 2023-08-01
Payer: COMMERCIAL

## 2023-08-01 VITALS
OXYGEN SATURATION: 98 % | DIASTOLIC BLOOD PRESSURE: 74 MMHG | WEIGHT: 280 LBS | HEIGHT: 71 IN | BODY MASS INDEX: 39.2 KG/M2 | RESPIRATION RATE: 16 BRPM | HEART RATE: 81 BPM | SYSTOLIC BLOOD PRESSURE: 126 MMHG

## 2023-08-01 PROCEDURE — 99204 OFFICE O/P NEW MOD 45 MIN: CPT

## 2023-08-04 NOTE — ASSESSMENT
[FreeTextEntry1] : Mrs. 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 PMH ex-smoker, asthma. Arrives today for evaluation and treatment of Hiatal Hernia and Mickey's esophagus. On omeprazole and having bad reflux symptoms without improvement. She was seen by her GI doctor and had an endoscopy which shows moderate to large hiatal hernia and arrives for evaluation for possible hiatal hernia repair.   Plan: #Hiatal Hernia Needs X-Ray Esophageal Ct Chest Plan for Hiatal Hernia Repair Needs cardiac clearance.  I Tyrel Evans, [facility] am acting as the scribe for Dr. Matteo Whitman I, Armani Whitman saw, examined and reviewed the diagnostic images on patient:  AWAIS PIMENTEL on 08/01/2023 and agreed with my Nurse Practitioner's clinical note, physical exam findings and treatment plan. Patient presented to the office in consultation with a diagnosis of hiatal hernia, GERD and Tavarez's esophagus. She is a 48-year-old female who endorses many years of typical GERD symptoms: heartburn, regurgitation and epigastric discomfort, no dyphagia. Recent EGD: small hiatal hernia, distal esophagitis, pathology: short segment of Tavarez's with no dysplasia. Partial relief of symptoms with PPIs BID. She also has a diagnosis of asthma, which may be exacerbated by silent aspiration. I described in detail robotic assisted laparoscopic hiatal hernia repair and fundoplication. Risks and possible complications as well as side effects and expected long-lasting lifestyle modifications were disclosed. Patient understood and agreed to proceed. Plan: Needs esophagram, CT chest, cardology assessment preoperatively.

## 2023-08-04 NOTE — HISTORY OF PRESENT ILLNESS
[FreeTextEntry1] : Mrs. 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 PMH ex-smoker, asthma. Arrives today for evaluation and treatment of Hiatal Hernia and Mickey's esophagus. On omeprazole and having bad reflux symptoms without improvement. She was seen by her GI doctor and had an edoscopy which shows moderate to large hiatal hernia and arrives for evaluation for possible hiatal hernia repair.    Healthcare Team PMD: Werner Gastro: Patel Hepatology: Conrado   Smoker ex smoker: quite 20 years ago  Start 20 years 1/2 ppd  Covid:07/2022

## 2023-08-09 ENCOUNTER — APPOINTMENT (OUTPATIENT)
Dept: GASTROENTEROLOGY | Facility: CLINIC | Age: 48
End: 2023-08-09
Payer: COMMERCIAL

## 2023-08-09 VITALS
WEIGHT: 280 LBS | SYSTOLIC BLOOD PRESSURE: 113 MMHG | OXYGEN SATURATION: 99 % | BODY MASS INDEX: 39.2 KG/M2 | DIASTOLIC BLOOD PRESSURE: 88 MMHG | HEIGHT: 71 IN | HEART RATE: 78 BPM

## 2023-08-09 PROCEDURE — 99214 OFFICE O/P EST MOD 30 MIN: CPT

## 2023-08-10 NOTE — PHYSICAL EXAM
[General Appearance - Alert] : alert [Sclera] : the sclera and conjunctiva were normal [Outer Ear] : the ears and nose were normal in appearance [Neck Cervical Mass (___cm)] : no neck mass was observed [Thyroid Diffuse Enlargement] : the thyroid was not enlarged [Auscultation Breath Sounds / Voice Sounds] : lungs were clear to auscultation bilaterally [Heart Sounds] : normal S1 and S2 [Murmurs] : no murmurs [Edema] : there was no peripheral edema [Abdomen Soft] : soft [Abdomen Tenderness] : non-tender [] : no hepato-splenomegaly [Nail Clubbing] : no clubbing  or cyanosis of the fingernails [Musculoskeletal - Swelling] : no joint swelling seen [No Focal Deficits] : no focal deficits [Oriented To Time, Place, And Person] : oriented to person, place, and time [Scleral Icterus] : No Scleral Icterus [Spider Angioma] : No spider angioma(s) were observed [Abdominal  Ascites] : no ascites [Splenomegaly] : no splenomegaly [Liver Palpable ___ Finger Breadths Below Costal Margin] : was not palpable below costal margin [Asterixis] : no asterixis observed [Jaundice] : No jaundice [FreeTextEntry1] : Obese

## 2023-08-10 NOTE — HISTORY OF PRESENT ILLNESS
[___ Month(s) Ago] : [unfilled] month(s) ago [___] : Performed [unfilled] [de-identified] : 10/26/22 [de-identified] : Patient reports that in Dec 2015 she was extremely sick with elevated liver enzymes. She had severe fatigue and jaundice. Tests revealed AST ALT in 900s and she had ERCP complicated by perforation and pancreatitis. Jaundice resolved after a prolonged hospital stay at [facility]. She has monitoring of liver tests every 6 months and AST ALT are in the 40s. She was 250 lb for many years prior and has been 275 in the past year. No Hx of HTN HLD DM. No hx of supplement use, herbal teas or antibiotics. Was on OC pills in her teens. Patient also states that she has a cyst on her liver.   Tolerating semaglutide. No side effects. Has chronic regurgitation and reports having hiatal hernia surgery in September.  Has lost 26 lb in total but none since Oct 2022. Dose was increased to 1.7 mg weekly in May. Not losing wt now. Has not been careful with diet. Not exercising much. No abdominal pain jaundice itching confusion hematemesis or melena. No thyroid issues.  No liver disease or liver cancer in family. No family hx of any cancer including medullary cancer of thyroid or MEN  [de-identified] : Esophagus Lumen A large size hiatal hernia was seen, the esophagogastric  junction(EGJ) was noted at 38 cm, with hiatal narrowing at 34 cm from the  incisors. Retroflexion view in the stomach confirmed the size and morphology of  the hernia.    Mucosa Grade A esophagitis was seen in the gastroesophageal junction, compatible  with non-erosive esophagitis.Multiple cold forceps biopsies were performed for  histology.    Stomach Mucosa Diffuse erythema of the mucosa was noted in the stomach. These  findings are compatible with non-erosive gastritis.Multiple cold forceps  biopsies were performed for histology.    Duodenum Mucosa Normal mucosa was noted in the whole examined duodenum.    EGD Impressions:    Grade A esophagitis in the gastroesophageal junction compatible with  non-erosive esophagitis. (Biopsy).    Erythema in the stomach compatible with non-erosive gastritis. (Biopsy).    Normal mucosa in the whole examined duodenum.    Esophageal hiatal hernia. [de-identified] : Protruding lesions A single sessile 8 mm polyp of benign appearance was found in  the transverse colon.A single-piece polypectomy was performed using a cold  snare. The polyp was completely removed.    A single sessile 3 mm polyp was found in the rectum.A single-piece polypectomy  was performed using a cold forceps. The polyp was completely removed.    Small external hemorrhoids were noted.    Colonoscopy Impressions:    Polyp (8 mm) in the transverse colon. (Polypectomy).    Polyp (3 mm) in the rectum. (Polypectomy).    External hemorrhoids.

## 2023-08-10 NOTE — ASSESSMENT
[FreeTextEntry1] : 47-year-old  female with morbid obesity hepatic steatosis Tavarez's  hx of elevated transaminases seen in ffollow up.   Morbid obesity with NAFLD Has severe steatosis  S2 score and no fibrosis on VCTE BMI 44 initially with wt 306 lb  Has tried diet and exercise for years with no success.  Started semaglutide therapy 0.25 mg /weekly in May 2022.   Counseled on MEN MTC and pancreatitis. Side effects nausea bloating flatulence. Increased dose to 0.5 mg weekly in July 2022 and 1 mg in Jan 2023 followed by 1.7 mg in May 2023. Tolerating well with no side effect.  Lost 26 lb total but not since Oct 2023 Will increase dose to 2.4 mg weekly.  Patient having hiatal hernia surgery in September. Will discuss with CTS and bariatric surgery if can have sleeve done at the same time or post surgery with endobariatric.  Elevated transaminases - normal now Reports admission for jaundice and severe elevation of transaminases in 2015 with ERCP complicated by perforation and pancreatitis. No jaundice after that.  Since then has mild elevation of transaminases and told had fatty liver.  AST ALT 40s in March 2022  Fibrosan showed CAP score 350 S2 LS 7.9 kPa F0 -1 US did not report steatosis. Patient is morbidly obese but does not have other components of metabolic syndrome. A1c 5.4 LDL TG normal May 2023 ASMA borderline. AMA Ig levels normal ceruloplasmin normal iron sat normal. Liver tests remain normal.   Hepatic cyst Benign appearance. 2 cm left hepatic cyst.  Health maintenance HCV negative Hepatitis A not immune. Not sure if wants to do the vaccination.   Hep B - immune.  Colonoscopy done in Sept 2022  Follow up in 6 months.

## 2023-08-10 NOTE — REVIEW OF SYSTEMS
[As Noted in HPI] : as noted in HPI [Negative] : ENT [Fever] : no fever [Chills] : no chills [Chest Pain] : no chest pain [Palpitations] : no palpitations [Cough] : no cough [SOB on Exertion] : no shortness of breath during exertion [Joint Swelling] : no joint swelling [Joint Stiffness] : no joint stiffness [Skin Lesions] : no skin lesions [Confused] : no confusion [Convulsions] : no convulsions [Anxiety] : no anxiety [Depression] : no depression [Easy Bleeding] : no tendency for easy bleeding [Easy Bruising] : no tendency for easy bruising

## 2023-08-22 ENCOUNTER — RESULT REVIEW (OUTPATIENT)
Age: 48
End: 2023-08-22

## 2023-08-22 ENCOUNTER — OUTPATIENT (OUTPATIENT)
Dept: OUTPATIENT SERVICES | Facility: HOSPITAL | Age: 48
LOS: 1 days | End: 2023-08-22
Payer: COMMERCIAL

## 2023-08-22 VITALS
TEMPERATURE: 98 F | SYSTOLIC BLOOD PRESSURE: 110 MMHG | WEIGHT: 278 LBS | RESPIRATION RATE: 16 BRPM | HEART RATE: 88 BPM | OXYGEN SATURATION: 100 % | HEIGHT: 71 IN | DIASTOLIC BLOOD PRESSURE: 73 MMHG

## 2023-08-22 DIAGNOSIS — K25.1 ACUTE GASTRIC ULCER WITH PERFORATION: Chronic | ICD-10-CM

## 2023-08-22 DIAGNOSIS — K44.9 DIAPHRAGMATIC HERNIA WITHOUT OBSTRUCTION OR GANGRENE: ICD-10-CM

## 2023-08-22 LAB
A1C WITH ESTIMATED AVERAGE GLUCOSE RESULT: 5.2 % — SIGNIFICANT CHANGE UP (ref 4–5.6)
ALBUMIN SERPL ELPH-MCNC: 5 G/DL — SIGNIFICANT CHANGE UP (ref 3.5–5.2)
ALLERGY+IMMUNOLOGY DIAG STUDY NOTE: SIGNIFICANT CHANGE UP
ALP SERPL-CCNC: 109 U/L — SIGNIFICANT CHANGE UP (ref 30–115)
ALT FLD-CCNC: 19 U/L — SIGNIFICANT CHANGE UP (ref 0–41)
ANION GAP SERPL CALC-SCNC: 12 MMOL/L — SIGNIFICANT CHANGE UP (ref 7–14)
APPEARANCE UR: CLEAR — SIGNIFICANT CHANGE UP
AST SERPL-CCNC: 23 U/L — SIGNIFICANT CHANGE UP (ref 0–41)
BACTERIA # UR AUTO: ABNORMAL /HPF
BASOPHILS # BLD AUTO: 0.05 K/UL — SIGNIFICANT CHANGE UP (ref 0–0.2)
BASOPHILS NFR BLD AUTO: 0.5 % — SIGNIFICANT CHANGE UP (ref 0–1)
BILIRUB SERPL-MCNC: 0.7 MG/DL — SIGNIFICANT CHANGE UP (ref 0.2–1.2)
BILIRUB UR-MCNC: NEGATIVE — SIGNIFICANT CHANGE UP
BLD GP AB SCN SERPL QL: SIGNIFICANT CHANGE UP
BUN SERPL-MCNC: 23 MG/DL — HIGH (ref 10–20)
CALCIUM SERPL-MCNC: 9.3 MG/DL — SIGNIFICANT CHANGE UP (ref 8.4–10.5)
CAST: 2 /LPF — SIGNIFICANT CHANGE UP (ref 0–4)
CHLORIDE SERPL-SCNC: 103 MMOL/L — SIGNIFICANT CHANGE UP (ref 98–110)
CO2 SERPL-SCNC: 25 MMOL/L — SIGNIFICANT CHANGE UP (ref 17–32)
COLOR SPEC: YELLOW — SIGNIFICANT CHANGE UP
CREAT SERPL-MCNC: 0.9 MG/DL — SIGNIFICANT CHANGE UP (ref 0.7–1.5)
DIFF PNL FLD: ABNORMAL
DIR ANTIGLOB POLYSPECIFIC INTERPRETATION: SIGNIFICANT CHANGE UP
EGFR: 78 ML/MIN/1.73M2 — SIGNIFICANT CHANGE UP
EOSINOPHIL # BLD AUTO: 0.05 K/UL — SIGNIFICANT CHANGE UP (ref 0–0.7)
EOSINOPHIL NFR BLD AUTO: 0.5 % — SIGNIFICANT CHANGE UP (ref 0–8)
ESTIMATED AVERAGE GLUCOSE: 103 MG/DL — SIGNIFICANT CHANGE UP (ref 68–114)
GLUCOSE SERPL-MCNC: 73 MG/DL — SIGNIFICANT CHANGE UP (ref 70–99)
GLUCOSE UR QL: NEGATIVE MG/DL — SIGNIFICANT CHANGE UP
HCT VFR BLD CALC: 42.6 % — SIGNIFICANT CHANGE UP (ref 37–47)
HGB BLD-MCNC: 14.3 G/DL — SIGNIFICANT CHANGE UP (ref 12–16)
IMM GRANULOCYTES NFR BLD AUTO: 0.5 % — HIGH (ref 0.1–0.3)
INR BLD: 1.03 RATIO — SIGNIFICANT CHANGE UP (ref 0.65–1.3)
KETONES UR-MCNC: ABNORMAL MG/DL
LEUKOCYTE ESTERASE UR-ACNC: NEGATIVE — SIGNIFICANT CHANGE UP
LYMPHOCYTES # BLD AUTO: 3.18 K/UL — SIGNIFICANT CHANGE UP (ref 1.2–3.4)
LYMPHOCYTES # BLD AUTO: 33.6 % — SIGNIFICANT CHANGE UP (ref 20.5–51.1)
MCHC RBC-ENTMCNC: 30.4 PG — SIGNIFICANT CHANGE UP (ref 27–31)
MCHC RBC-ENTMCNC: 33.6 G/DL — SIGNIFICANT CHANGE UP (ref 32–37)
MCV RBC AUTO: 90.4 FL — SIGNIFICANT CHANGE UP (ref 81–99)
MONOCYTES # BLD AUTO: 0.72 K/UL — HIGH (ref 0.1–0.6)
MONOCYTES NFR BLD AUTO: 7.6 % — SIGNIFICANT CHANGE UP (ref 1.7–9.3)
NEUTROPHILS # BLD AUTO: 5.42 K/UL — SIGNIFICANT CHANGE UP (ref 1.4–6.5)
NEUTROPHILS NFR BLD AUTO: 57.3 % — SIGNIFICANT CHANGE UP (ref 42.2–75.2)
NITRITE UR-MCNC: NEGATIVE — SIGNIFICANT CHANGE UP
NRBC # BLD: 0 /100 WBCS — SIGNIFICANT CHANGE UP (ref 0–0)
PH UR: 6 — SIGNIFICANT CHANGE UP (ref 5–8)
PLATELET # BLD AUTO: 238 K/UL — SIGNIFICANT CHANGE UP (ref 130–400)
PMV BLD: 9.9 FL — SIGNIFICANT CHANGE UP (ref 7.4–10.4)
POTASSIUM SERPL-MCNC: 4.6 MMOL/L — SIGNIFICANT CHANGE UP (ref 3.5–5)
POTASSIUM SERPL-SCNC: 4.6 MMOL/L — SIGNIFICANT CHANGE UP (ref 3.5–5)
PROT SERPL-MCNC: 7.7 G/DL — SIGNIFICANT CHANGE UP (ref 6–8)
PROT UR-MCNC: NEGATIVE MG/DL — SIGNIFICANT CHANGE UP
PROTHROM AB SERPL-ACNC: 11.7 SEC — SIGNIFICANT CHANGE UP (ref 9.95–12.87)
RBC # BLD: 4.71 M/UL — SIGNIFICANT CHANGE UP (ref 4.2–5.4)
RBC # FLD: 13.1 % — SIGNIFICANT CHANGE UP (ref 11.5–14.5)
RBC CASTS # UR COMP ASSIST: 14 /HPF — HIGH (ref 0–4)
SODIUM SERPL-SCNC: 140 MMOL/L — SIGNIFICANT CHANGE UP (ref 135–146)
SP GR SPEC: 1.03 — SIGNIFICANT CHANGE UP (ref 1–1.03)
SQUAMOUS # UR AUTO: 4 /HPF — SIGNIFICANT CHANGE UP (ref 0–5)
UROBILINOGEN FLD QL: 0.2 MG/DL — SIGNIFICANT CHANGE UP (ref 0.2–1)
WBC # BLD: 9.47 K/UL — SIGNIFICANT CHANGE UP (ref 4.8–10.8)
WBC # FLD AUTO: 9.47 K/UL — SIGNIFICANT CHANGE UP (ref 4.8–10.8)
WBC UR QL: 2 /HPF — SIGNIFICANT CHANGE UP (ref 0–5)

## 2023-08-22 PROCEDURE — 86905 BLOOD TYPING RBC ANTIGENS: CPT

## 2023-08-22 PROCEDURE — 83036 HEMOGLOBIN GLYCOSYLATED A1C: CPT

## 2023-08-22 PROCEDURE — 93005 ELECTROCARDIOGRAM TRACING: CPT

## 2023-08-22 PROCEDURE — 85610 PROTHROMBIN TIME: CPT

## 2023-08-22 PROCEDURE — 81001 URINALYSIS AUTO W/SCOPE: CPT

## 2023-08-22 PROCEDURE — 71046 X-RAY EXAM CHEST 2 VIEWS: CPT | Mod: 26

## 2023-08-22 PROCEDURE — 85025 COMPLETE CBC W/AUTO DIFF WBC: CPT

## 2023-08-22 PROCEDURE — 80053 COMPREHEN METABOLIC PANEL: CPT

## 2023-08-22 PROCEDURE — 86900 BLOOD TYPING SEROLOGIC ABO: CPT

## 2023-08-22 PROCEDURE — 99214 OFFICE O/P EST MOD 30 MIN: CPT | Mod: 25

## 2023-08-22 PROCEDURE — 86870 RBC ANTIBODY IDENTIFICATION: CPT

## 2023-08-22 PROCEDURE — 86850 RBC ANTIBODY SCREEN: CPT

## 2023-08-22 PROCEDURE — 71046 X-RAY EXAM CHEST 2 VIEWS: CPT

## 2023-08-22 PROCEDURE — 86880 COOMBS TEST DIRECT: CPT

## 2023-08-22 PROCEDURE — 86901 BLOOD TYPING SEROLOGIC RH(D): CPT

## 2023-08-22 PROCEDURE — 93010 ELECTROCARDIOGRAM REPORT: CPT

## 2023-08-22 PROCEDURE — 36415 COLL VENOUS BLD VENIPUNCTURE: CPT

## 2023-08-22 RX ORDER — SEMAGLUTIDE 0.68 MG/ML
0 INJECTION, SOLUTION SUBCUTANEOUS
Qty: 0 | Refills: 0 | DISCHARGE

## 2023-08-22 NOTE — H&P PST ADULT - REASON FOR ADMISSION
Case Type: OP  Suite: [facility]  Proceduralist: Armani Whitman  Confirmed Surgery Date Time: 09-   PAST Date Time: 08- - 17:15  Procedure: ESOPHAGOGASTRODUODENOSCOPY ROBOTIC ASSISTED LAPAROSCOPIC HIATAL HERNIA REPAIR FUNDOPLICATION  Laterality: N/A  Length of Procedure: 120 Minutes  Anesthesia Type: General

## 2023-08-22 NOTE — H&P PST ADULT - NSICDXPASTMEDICALHX_GEN_ALL_CORE_FT
PAST MEDICAL HISTORY:  Asthma     Tavarez's esophagus     Elevated liver enzymes     Ex-cigarette smoker     Hepatic steatosis     Obese     Transfusion history

## 2023-08-22 NOTE — H&P PST ADULT - HISTORY OF PRESENT ILLNESS
Evaristo Gomez is a 49 yo female with PMH of  morbidly obese,Tavarez's esophagus, elevated liver enzymes (2015), AST/, complicated ERCP ( perforation, pancreatitis and received 8 units of blood)  2017at [facility], liver cyst, ex smoker, asthma who presents to pretesting for the above surgery due to hiatal hernia, GERD and regurgitation at night.   Patient denies any cp, sob, palpitations, fever, cough, URI, abdominal pains, N/V, UTI, Rashes or open wounds.  As per patient exercise tolerance of 3-4 fos walks with out sob  Patient had COVID x 2 ( Last 07/2022)   Patient denies any s/s covid 19 and reports no contact with known positive people. Patient instructed to continue to self monitor and report any concerns to MD. Pt will continue to practice self isolation and  exposure control measures pre op  Anesthesia Alert  Class IV-Difficult Airway  NO--History of neck surgery or radiation  NO--Limited ROM of neck  NO--History of Malignant hyperthermia  NO--Personal or family history of Pseudocholinesterase deficiency  NO--Prior Anesthesia Complication  NO--Latex Allergy  NO--Loose teeth  NO--History of Rheumatoid Arthritis  NO--BRITTNI  NO--Risk of Bleedings   Pt instructed to stop vitamins/supplements/herbal medications for one week prior to surgery  As per patient this is the complete medical, surgical history and medications.  Incentive spirometer given Goal 2850 achieved 4000  .Bustamante Activity Status Index (DASI) from [employer]  on 8/22/2023  ** All calculations should be rechecked by clinician prior to use **  RESULT SUMMARY:  58.2 points  The higher the score (maximum 58.2), the higher the functional status.  9.89 METs  INPUTS:  Take care of self —> 2.75 = Yes  Walk indoors —> 1.75 = Yes  Walk 1&ndash;2 blocks on level ground —> 2.75 = Yes  Climb a flight of stairs or walk up a hill —> 5.5 = Yes  Run a short distance —> 8 = Yes  Do light work around the house —> 2.7 = Yes  Do moderate work around the house —> 3.5 = Yes  Do heavy work around the house —> 8 = Yes  Do yardwork —> 4.5 = Yes  Have sexual relations —> 5.25 = Yes  Participate in moderate recreational activities —> 6 = Yes  Participate in strenuous sports —> 7.5 = Yes  Revised Cardiac Risk Index for Pre-Operative Risk from [facility]  on 8/22/2023  ** All calculations should be rechecked by clinician prior to use **  RESULT SUMMARY:  1 points  Class II Risk  6.0 %  30-day risk of death, MI, or cardiac arrest  INPUTS:  Elevated-risk surgery —> 1 = Yes  History of ischemic heart disease —> 0 = No  History of congestive heart failure —> 0 = No  History of cerebrovascular disease —> 0 = No  Pre-operative treatment with insulin —> 0 = No  Pre-operative creatinine >2 mg/dL / 176.8 µmol/L —> 0 = No

## 2023-08-23 ENCOUNTER — RESULT REVIEW (OUTPATIENT)
Age: 48
End: 2023-08-23

## 2023-08-23 ENCOUNTER — OUTPATIENT (OUTPATIENT)
Dept: OUTPATIENT SERVICES | Facility: HOSPITAL | Age: 48
LOS: 1 days | End: 2023-08-23
Payer: COMMERCIAL

## 2023-08-23 DIAGNOSIS — K44.9 DIAPHRAGMATIC HERNIA WITHOUT OBSTRUCTION OR GANGRENE: ICD-10-CM

## 2023-08-23 DIAGNOSIS — Z00.8 ENCOUNTER FOR OTHER GENERAL EXAMINATION: ICD-10-CM

## 2023-08-23 DIAGNOSIS — K25.1 ACUTE GASTRIC ULCER WITH PERFORATION: Chronic | ICD-10-CM

## 2023-08-23 PROCEDURE — 71250 CT THORAX DX C-: CPT | Mod: 26

## 2023-08-23 PROCEDURE — 71250 CT THORAX DX C-: CPT

## 2023-08-24 DIAGNOSIS — K44.9 DIAPHRAGMATIC HERNIA WITHOUT OBSTRUCTION OR GANGRENE: ICD-10-CM

## 2023-08-25 PROBLEM — Z87.891 PERSONAL HISTORY OF NICOTINE DEPENDENCE: Chronic | Status: ACTIVE | Noted: 2023-08-22

## 2023-08-25 PROBLEM — K76.0 FATTY (CHANGE OF) LIVER, NOT ELSEWHERE CLASSIFIED: Chronic | Status: ACTIVE | Noted: 2023-08-22

## 2023-08-25 PROBLEM — Z92.89 PERSONAL HISTORY OF OTHER MEDICAL TREATMENT: Chronic | Status: ACTIVE | Noted: 2023-08-22

## 2023-08-25 PROBLEM — J45.909 UNSPECIFIED ASTHMA, UNCOMPLICATED: Chronic | Status: ACTIVE | Noted: 2023-08-22

## 2023-08-28 ENCOUNTER — RESULT REVIEW (OUTPATIENT)
Age: 48
End: 2023-08-28

## 2023-08-28 ENCOUNTER — OUTPATIENT (OUTPATIENT)
Dept: OUTPATIENT SERVICES | Facility: HOSPITAL | Age: 48
LOS: 1 days | End: 2023-08-28
Payer: COMMERCIAL

## 2023-08-28 DIAGNOSIS — K25.1 ACUTE GASTRIC ULCER WITH PERFORATION: Chronic | ICD-10-CM

## 2023-08-28 DIAGNOSIS — Z00.8 ENCOUNTER FOR OTHER GENERAL EXAMINATION: ICD-10-CM

## 2023-08-28 DIAGNOSIS — K44.9 DIAPHRAGMATIC HERNIA WITHOUT OBSTRUCTION OR GANGRENE: ICD-10-CM

## 2023-08-28 PROCEDURE — 74220 X-RAY XM ESOPHAGUS 1CNTRST: CPT

## 2023-08-28 PROCEDURE — 74220 X-RAY XM ESOPHAGUS 1CNTRST: CPT | Mod: 26

## 2023-08-29 ENCOUNTER — APPOINTMENT (OUTPATIENT)
Dept: CARDIOTHORACIC SURGERY | Facility: CLINIC | Age: 48
End: 2023-08-29
Payer: COMMERCIAL

## 2023-08-29 DIAGNOSIS — K44.9 DIAPHRAGMATIC HERNIA WITHOUT OBSTRUCTION OR GANGRENE: ICD-10-CM

## 2023-08-29 PROCEDURE — 99442: CPT

## 2023-09-01 NOTE — ASSESSMENT
[FreeTextEntry1] : Mrs. 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 On omeprazole and having bad reflux symptoms without improvement. Called today to review her CT chest and esophagram.   Reviewed imaging  Plan for surgery on 9/11  Needs CHING  I, [facility], am acting as scribe for Dr. Matteo Whitman   I, Armani Whitman reviewed the diagnostic images on patient:  AWAIS PIMENTEL on 08/29/2023 and agreed with my Nurse Practitioner's clinical note and treatment plan. Patient has had diagnosis of hiatal hernia and GERD and she is planned for surgical repair.  CT scan of the chest and esophagram images were reviewed and discussed with the patient.  Small to moderate sliding hiatal hernia in the setting of moderate to severe GERD symptoms with partial relief with PPIs.  Surgical intervention was discussed again and no changes in plan.  Patient understood and agreed.

## 2023-09-01 NOTE — REASON FOR VISIT
[Home] : at home, [unfilled] , at the time of the visit. [Medical Office: ([facility])___] : at the medical office located in  [Verbal consent obtained from patient] : the patient, [unfilled]

## 2023-09-01 NOTE — HISTORY OF PRESENT ILLNESS
[FreeTextEntry1] : Mrs. 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 PMH ex-smoker, asthma. Arrives today for evaluation and treatment of Hiatal Hernia and Mickey's esophagus. On omeprazole and having bad reflux symptoms without improvement. She was seen by her GI doctor and had an edoscopy which shows moderate to large hiatal hernia and arrives for evaluation for possible hiatal hernia repair.   Healthcare Team PMD: Werner Gastro: Patel Hepatology: Conrado   Smoker ex smoker: quite 20 years ago Start 20 years 1/2 ppd Covid:07/2022 ECOG 0 GERD score 39 on PPI

## 2023-09-11 ENCOUNTER — APPOINTMENT (OUTPATIENT)
Dept: CARDIOTHORACIC SURGERY | Facility: HOSPITAL | Age: 48
End: 2023-09-11

## 2023-09-11 ENCOUNTER — INPATIENT (INPATIENT)
Facility: HOSPITAL | Age: 48
LOS: 0 days | Discharge: HOME | DRG: 328 | End: 2023-09-12
Attending: THORACIC SURGERY (CARDIOTHORACIC VASCULAR SURGERY) | Admitting: THORACIC SURGERY (CARDIOTHORACIC VASCULAR SURGERY)
Payer: COMMERCIAL

## 2023-09-11 ENCOUNTER — TRANSCRIPTION ENCOUNTER (OUTPATIENT)
Age: 48
End: 2023-09-11

## 2023-09-11 VITALS
SYSTOLIC BLOOD PRESSURE: 118 MMHG | HEART RATE: 72 BPM | TEMPERATURE: 98 F | WEIGHT: 270.07 LBS | HEIGHT: 71 IN | RESPIRATION RATE: 18 BRPM | DIASTOLIC BLOOD PRESSURE: 67 MMHG | OXYGEN SATURATION: 97 %

## 2023-09-11 DIAGNOSIS — K44.9 DIAPHRAGMATIC HERNIA WITHOUT OBSTRUCTION OR GANGRENE: ICD-10-CM

## 2023-09-11 DIAGNOSIS — J45.909 UNSPECIFIED ASTHMA, UNCOMPLICATED: ICD-10-CM

## 2023-09-11 DIAGNOSIS — K25.1 ACUTE GASTRIC ULCER WITH PERFORATION: Chronic | ICD-10-CM

## 2023-09-11 DIAGNOSIS — Z87.891 PERSONAL HISTORY OF NICOTINE DEPENDENCE: ICD-10-CM

## 2023-09-11 DIAGNOSIS — Z88.0 ALLERGY STATUS TO PENICILLIN: ICD-10-CM

## 2023-09-11 DIAGNOSIS — K20.90 ESOPHAGITIS, UNSPECIFIED WITHOUT BLEEDING: ICD-10-CM

## 2023-09-11 DIAGNOSIS — K76.0 FATTY (CHANGE OF) LIVER, NOT ELSEWHERE CLASSIFIED: ICD-10-CM

## 2023-09-11 DIAGNOSIS — Z86.16 PERSONAL HISTORY OF COVID-19: ICD-10-CM

## 2023-09-11 DIAGNOSIS — K21.9 GASTRO-ESOPHAGEAL REFLUX DISEASE WITHOUT ESOPHAGITIS: ICD-10-CM

## 2023-09-11 DIAGNOSIS — R11.10 VOMITING, UNSPECIFIED: ICD-10-CM

## 2023-09-11 LAB
ALBUMIN SERPL ELPH-MCNC: 4.2 G/DL — SIGNIFICANT CHANGE UP (ref 3.5–5.2)
ALP SERPL-CCNC: 94 U/L — SIGNIFICANT CHANGE UP (ref 30–115)
ALT FLD-CCNC: 21 U/L — SIGNIFICANT CHANGE UP (ref 0–41)
ANION GAP SERPL CALC-SCNC: 17 MMOL/L — HIGH (ref 7–14)
AST SERPL-CCNC: 31 U/L — SIGNIFICANT CHANGE UP (ref 0–41)
BASOPHILS # BLD AUTO: 0.03 K/UL — SIGNIFICANT CHANGE UP (ref 0–0.2)
BASOPHILS NFR BLD AUTO: 0.3 % — SIGNIFICANT CHANGE UP (ref 0–1)
BILIRUB SERPL-MCNC: 0.8 MG/DL — SIGNIFICANT CHANGE UP (ref 0.2–1.2)
BUN SERPL-MCNC: 19 MG/DL — SIGNIFICANT CHANGE UP (ref 10–20)
CALCIUM SERPL-MCNC: 8.9 MG/DL — SIGNIFICANT CHANGE UP (ref 8.4–10.5)
CHLORIDE SERPL-SCNC: 102 MMOL/L — SIGNIFICANT CHANGE UP (ref 98–110)
CO2 SERPL-SCNC: 18 MMOL/L — SIGNIFICANT CHANGE UP (ref 17–32)
CREAT SERPL-MCNC: 1 MG/DL — SIGNIFICANT CHANGE UP (ref 0.7–1.5)
EGFR: 69 ML/MIN/1.73M2 — SIGNIFICANT CHANGE UP
EOSINOPHIL # BLD AUTO: 0 K/UL — SIGNIFICANT CHANGE UP (ref 0–0.7)
EOSINOPHIL NFR BLD AUTO: 0 % — SIGNIFICANT CHANGE UP (ref 0–8)
GLUCOSE SERPL-MCNC: 147 MG/DL — HIGH (ref 70–99)
HCT VFR BLD CALC: 41.7 % — SIGNIFICANT CHANGE UP (ref 37–47)
HGB BLD-MCNC: 14 G/DL — SIGNIFICANT CHANGE UP (ref 12–16)
IMM GRANULOCYTES NFR BLD AUTO: 1.5 % — HIGH (ref 0.1–0.3)
LYMPHOCYTES # BLD AUTO: 0.93 K/UL — LOW (ref 1.2–3.4)
LYMPHOCYTES # BLD AUTO: 9.6 % — LOW (ref 20.5–51.1)
MAGNESIUM SERPL-MCNC: 1.8 MG/DL — SIGNIFICANT CHANGE UP (ref 1.8–2.4)
MCHC RBC-ENTMCNC: 30.2 PG — SIGNIFICANT CHANGE UP (ref 27–31)
MCHC RBC-ENTMCNC: 33.6 G/DL — SIGNIFICANT CHANGE UP (ref 32–37)
MCV RBC AUTO: 90.1 FL — SIGNIFICANT CHANGE UP (ref 81–99)
MONOCYTES # BLD AUTO: 0.14 K/UL — SIGNIFICANT CHANGE UP (ref 0.1–0.6)
MONOCYTES NFR BLD AUTO: 1.5 % — LOW (ref 1.7–9.3)
NEUTROPHILS # BLD AUTO: 8.4 K/UL — HIGH (ref 1.4–6.5)
NEUTROPHILS NFR BLD AUTO: 87.1 % — HIGH (ref 42.2–75.2)
NRBC # BLD: 0 /100 WBCS — SIGNIFICANT CHANGE UP (ref 0–0)
PHOSPHATE SERPL-MCNC: 4.6 MG/DL — SIGNIFICANT CHANGE UP (ref 2.1–4.9)
PLATELET # BLD AUTO: 221 K/UL — SIGNIFICANT CHANGE UP (ref 130–400)
PMV BLD: 9.4 FL — SIGNIFICANT CHANGE UP (ref 7.4–10.4)
POTASSIUM SERPL-MCNC: 4.8 MMOL/L — SIGNIFICANT CHANGE UP (ref 3.5–5)
POTASSIUM SERPL-SCNC: 4.8 MMOL/L — SIGNIFICANT CHANGE UP (ref 3.5–5)
PROT SERPL-MCNC: 7.1 G/DL — SIGNIFICANT CHANGE UP (ref 6–8)
RBC # BLD: 4.63 M/UL — SIGNIFICANT CHANGE UP (ref 4.2–5.4)
RBC # FLD: 12.8 % — SIGNIFICANT CHANGE UP (ref 11.5–14.5)
SODIUM SERPL-SCNC: 137 MMOL/L — SIGNIFICANT CHANGE UP (ref 135–146)
WBC # BLD: 9.64 K/UL — SIGNIFICANT CHANGE UP (ref 4.8–10.8)
WBC # FLD AUTO: 9.64 K/UL — SIGNIFICANT CHANGE UP (ref 4.8–10.8)

## 2023-09-11 PROCEDURE — 71045 X-RAY EXAM CHEST 1 VIEW: CPT

## 2023-09-11 PROCEDURE — 84100 ASSAY OF PHOSPHORUS: CPT

## 2023-09-11 PROCEDURE — 43281 LAP PARAESOPHAG HERN REPAIR: CPT | Mod: AS

## 2023-09-11 PROCEDURE — S2900 ROBOTIC SURGICAL SYSTEM: CPT | Mod: NC

## 2023-09-11 PROCEDURE — 36415 COLL VENOUS BLD VENIPUNCTURE: CPT

## 2023-09-11 PROCEDURE — 43281 LAP PARAESOPHAG HERN REPAIR: CPT

## 2023-09-11 PROCEDURE — 80053 COMPREHEN METABOLIC PANEL: CPT

## 2023-09-11 PROCEDURE — 85025 COMPLETE CBC W/AUTO DIFF WBC: CPT

## 2023-09-11 PROCEDURE — C9113: CPT

## 2023-09-11 PROCEDURE — 71045 X-RAY EXAM CHEST 1 VIEW: CPT | Mod: 26

## 2023-09-11 PROCEDURE — 74220 X-RAY XM ESOPHAGUS 1CNTRST: CPT

## 2023-09-11 PROCEDURE — 83735 ASSAY OF MAGNESIUM: CPT

## 2023-09-11 RX ORDER — NALOXONE HYDROCHLORIDE 4 MG/.1ML
0.1 SPRAY NASAL
Refills: 0 | Status: DISCONTINUED | OUTPATIENT
Start: 2023-09-11 | End: 2023-09-12

## 2023-09-11 RX ORDER — SEMAGLUTIDE 0.68 MG/ML
1.7 INJECTION, SOLUTION SUBCUTANEOUS
Refills: 0 | DISCHARGE

## 2023-09-11 RX ORDER — GABAPENTIN 400 MG/1
300 CAPSULE ORAL ONCE
Refills: 0 | Status: COMPLETED | OUTPATIENT
Start: 2023-09-11 | End: 2023-09-11

## 2023-09-11 RX ORDER — ONDANSETRON 8 MG/1
4 TABLET, FILM COATED ORAL EVERY 4 HOURS
Refills: 0 | Status: DISCONTINUED | OUTPATIENT
Start: 2023-09-11 | End: 2023-09-12

## 2023-09-11 RX ORDER — ACETAMINOPHEN 500 MG
1000 TABLET ORAL ONCE
Refills: 0 | Status: COMPLETED | OUTPATIENT
Start: 2023-09-12 | End: 2023-09-12

## 2023-09-11 RX ORDER — CHLORHEXIDINE GLUCONATE 213 G/1000ML
5 SOLUTION TOPICAL
Refills: 0 | Status: DISCONTINUED | OUTPATIENT
Start: 2023-09-11 | End: 2023-09-11

## 2023-09-11 RX ORDER — OMEPRAZOLE 10 MG/1
1 CAPSULE, DELAYED RELEASE ORAL
Qty: 0 | Refills: 0 | DISCHARGE

## 2023-09-11 RX ORDER — SIMETHICONE 80 MG/1
80 TABLET, CHEWABLE ORAL EVERY 8 HOURS
Refills: 0 | Status: DISCONTINUED | OUTPATIENT
Start: 2023-09-11 | End: 2023-09-12

## 2023-09-11 RX ORDER — HEPARIN SODIUM 5000 [USP'U]/ML
5000 INJECTION INTRAVENOUS; SUBCUTANEOUS ONCE
Refills: 0 | Status: COMPLETED | OUTPATIENT
Start: 2023-09-11 | End: 2023-09-11

## 2023-09-11 RX ORDER — ACETAMINOPHEN 500 MG
1000 TABLET ORAL ONCE
Refills: 0 | Status: DISCONTINUED | OUTPATIENT
Start: 2023-09-12 | End: 2023-09-12

## 2023-09-11 RX ORDER — PANTOPRAZOLE SODIUM 20 MG/1
40 TABLET, DELAYED RELEASE ORAL DAILY
Refills: 0 | Status: DISCONTINUED | OUTPATIENT
Start: 2023-09-11 | End: 2023-09-12

## 2023-09-11 RX ORDER — ONDANSETRON 8 MG/1
4 TABLET, FILM COATED ORAL EVERY 6 HOURS
Refills: 0 | Status: DISCONTINUED | OUTPATIENT
Start: 2023-09-11 | End: 2023-09-12

## 2023-09-11 RX ORDER — ACETAMINOPHEN 500 MG
1000 TABLET ORAL ONCE
Refills: 0 | Status: DISCONTINUED | OUTPATIENT
Start: 2023-09-11 | End: 2023-09-11

## 2023-09-11 RX ORDER — SUCRALFATE 1 G
1 TABLET ORAL EVERY 12 HOURS
Refills: 0 | Status: DISCONTINUED | OUTPATIENT
Start: 2023-09-11 | End: 2023-09-12

## 2023-09-11 RX ORDER — MEPERIDINE HYDROCHLORIDE 50 MG/ML
25 INJECTION INTRAMUSCULAR; INTRAVENOUS; SUBCUTANEOUS ONCE
Refills: 0 | Status: DISCONTINUED | OUTPATIENT
Start: 2023-09-11 | End: 2023-09-12

## 2023-09-11 RX ORDER — HEPARIN SODIUM 5000 [USP'U]/ML
5000 INJECTION INTRAVENOUS; SUBCUTANEOUS EVERY 8 HOURS
Refills: 0 | Status: DISCONTINUED | OUTPATIENT
Start: 2023-09-11 | End: 2023-09-12

## 2023-09-11 RX ORDER — HYDROMORPHONE HYDROCHLORIDE 2 MG/ML
0.4 INJECTION INTRAMUSCULAR; INTRAVENOUS; SUBCUTANEOUS
Refills: 0 | Status: DISCONTINUED | OUTPATIENT
Start: 2023-09-11 | End: 2023-09-12

## 2023-09-11 RX ORDER — HYDROMORPHONE HYDROCHLORIDE 2 MG/ML
30 INJECTION INTRAMUSCULAR; INTRAVENOUS; SUBCUTANEOUS
Refills: 0 | Status: DISCONTINUED | OUTPATIENT
Start: 2023-09-11 | End: 2023-09-12

## 2023-09-11 RX ORDER — ACETAMINOPHEN 500 MG
1000 TABLET ORAL ONCE
Refills: 0 | Status: COMPLETED | OUTPATIENT
Start: 2023-09-12 | End: 2023-09-11

## 2023-09-11 RX ORDER — SODIUM CHLORIDE 9 MG/ML
1000 INJECTION INTRAMUSCULAR; INTRAVENOUS; SUBCUTANEOUS
Refills: 0 | Status: DISCONTINUED | OUTPATIENT
Start: 2023-09-11 | End: 2023-09-12

## 2023-09-11 RX ADMIN — Medication 400 MILLIGRAM(S): at 23:31

## 2023-09-11 RX ADMIN — HEPARIN SODIUM 5000 UNIT(S): 5000 INJECTION INTRAVENOUS; SUBCUTANEOUS at 14:12

## 2023-09-11 RX ADMIN — HYDROMORPHONE HYDROCHLORIDE 0.4 MILLIGRAM(S): 2 INJECTION INTRAMUSCULAR; INTRAVENOUS; SUBCUTANEOUS at 20:26

## 2023-09-11 RX ADMIN — HYDROMORPHONE HYDROCHLORIDE 0.4 MILLIGRAM(S): 2 INJECTION INTRAMUSCULAR; INTRAVENOUS; SUBCUTANEOUS at 21:35

## 2023-09-11 RX ADMIN — SODIUM CHLORIDE 75 MILLILITER(S): 9 INJECTION INTRAMUSCULAR; INTRAVENOUS; SUBCUTANEOUS at 21:35

## 2023-09-11 RX ADMIN — HEPARIN SODIUM 5000 UNIT(S): 5000 INJECTION INTRAVENOUS; SUBCUTANEOUS at 21:47

## 2023-09-11 RX ADMIN — ONDANSETRON 4 MILLIGRAM(S): 8 TABLET, FILM COATED ORAL at 20:31

## 2023-09-11 RX ADMIN — GABAPENTIN 300 MILLIGRAM(S): 400 CAPSULE ORAL at 14:12

## 2023-09-11 RX ADMIN — SIMETHICONE 80 MILLIGRAM(S): 80 TABLET, CHEWABLE ORAL at 21:47

## 2023-09-11 RX ADMIN — HYDROMORPHONE HYDROCHLORIDE 30 MILLILITER(S): 2 INJECTION INTRAMUSCULAR; INTRAVENOUS; SUBCUTANEOUS at 21:13

## 2023-09-11 NOTE — BRIEF OPERATIVE NOTE - OPERATION/FINDINGS
Sliding hiatal hernia. High mediastinal dissection of esophagus, which was brought down into the abdomen. 300 degree fundoplication of the stomach. Right and left Newton primarily approximated with 0 ethibond suture x 5.

## 2023-09-11 NOTE — BRIEF OPERATIVE NOTE - NSICDXBRIEFPROCEDURE_GEN_ALL_CORE_FT
PROCEDURES:  Repair, hiatal hernia, robot-assisted 11-Sep-2023 19:31:53  Carlo Donato  Nissen fundoplication 11-Sep-2023 19:32:07 300 degreestap blo Carlo Donato  Block, transversus abdominis plane, bilateral 11-Sep-2023 19:32:38  Carlo Donato

## 2023-09-11 NOTE — BRIEF OPERATIVE NOTE - COMMENTS
In order to meet Medicare requirements, the clinical documentation must support the information cited in the admission order.  Please be sure to provide detailed and clear documentation about the following in the admitting note/history and physical:
Renzo Whitehead PA-C first assisted in all major parts of the operation in the absence of a qualified surgeon, fellow, or resident physician.

## 2023-09-12 ENCOUNTER — TRANSCRIPTION ENCOUNTER (OUTPATIENT)
Age: 48
End: 2023-09-12

## 2023-09-12 VITALS
OXYGEN SATURATION: 95 % | RESPIRATION RATE: 15 BRPM | DIASTOLIC BLOOD PRESSURE: 74 MMHG | TEMPERATURE: 98 F | SYSTOLIC BLOOD PRESSURE: 113 MMHG | HEART RATE: 77 BPM

## 2023-09-12 PROCEDURE — 71045 X-RAY EXAM CHEST 1 VIEW: CPT | Mod: 26

## 2023-09-12 PROCEDURE — 74220 X-RAY XM ESOPHAGUS 1CNTRST: CPT | Mod: 26

## 2023-09-12 RX ORDER — MAGNESIUM SULFATE 500 MG/ML
2 VIAL (ML) INJECTION ONCE
Refills: 0 | Status: COMPLETED | OUTPATIENT
Start: 2023-09-12 | End: 2023-09-12

## 2023-09-12 RX ORDER — OXYCODONE HYDROCHLORIDE 5 MG/1
10 TABLET ORAL EVERY 4 HOURS
Refills: 0 | Status: DISCONTINUED | OUTPATIENT
Start: 2023-09-12 | End: 2023-09-12

## 2023-09-12 RX ORDER — KETOROLAC TROMETHAMINE 30 MG/ML
30 SYRINGE (ML) INJECTION EVERY 8 HOURS
Refills: 0 | Status: DISCONTINUED | OUTPATIENT
Start: 2023-09-12 | End: 2023-09-12

## 2023-09-12 RX ORDER — ALBUTEROL 90 UG/1
1 AEROSOL, METERED ORAL EVERY 6 HOURS
Refills: 0 | Status: DISCONTINUED | OUTPATIENT
Start: 2023-09-12 | End: 2023-09-12

## 2023-09-12 RX ORDER — ONDANSETRON 8 MG/1
5 TABLET, FILM COATED ORAL
Qty: 140 | Refills: 0
Start: 2023-09-12 | End: 2023-09-18

## 2023-09-12 RX ORDER — DIPHENHYDRAMINE HYDROCHLORIDE AND LIDOCAINE HYDROCHLORIDE AND ALUMINUM HYDROXIDE AND MAGNESIUM HYDRO
15 KIT EVERY 4 HOURS
Refills: 0 | Status: DISCONTINUED | OUTPATIENT
Start: 2023-09-12 | End: 2023-09-12

## 2023-09-12 RX ORDER — SIMETHICONE 80 MG/1
1 TABLET, CHEWABLE ORAL
Qty: 0 | Refills: 0 | DISCHARGE
Start: 2023-09-12

## 2023-09-12 RX ORDER — SIMETHICONE 80 MG/1
1 TABLET, CHEWABLE ORAL
Qty: 21 | Refills: 0
Start: 2023-09-12 | End: 2023-09-18

## 2023-09-12 RX ORDER — OXYCODONE HYDROCHLORIDE 5 MG/1
5 TABLET ORAL EVERY 4 HOURS
Refills: 0 | Status: DISCONTINUED | OUTPATIENT
Start: 2023-09-12 | End: 2023-09-12

## 2023-09-12 RX ADMIN — Medication 400 MILLIGRAM(S): at 06:43

## 2023-09-12 RX ADMIN — HEPARIN SODIUM 5000 UNIT(S): 5000 INJECTION INTRAVENOUS; SUBCUTANEOUS at 15:40

## 2023-09-12 RX ADMIN — Medication 25 GRAM(S): at 01:04

## 2023-09-12 RX ADMIN — Medication 1000 MILLIGRAM(S): at 07:06

## 2023-09-12 RX ADMIN — Medication 100 MILLIGRAM(S): at 15:33

## 2023-09-12 RX ADMIN — SIMETHICONE 80 MILLIGRAM(S): 80 TABLET, CHEWABLE ORAL at 15:40

## 2023-09-12 RX ADMIN — PANTOPRAZOLE SODIUM 40 MILLIGRAM(S): 20 TABLET, DELAYED RELEASE ORAL at 11:47

## 2023-09-12 RX ADMIN — Medication 100 MILLIGRAM(S): at 07:25

## 2023-09-12 RX ADMIN — Medication 100 MILLIGRAM(S): at 00:25

## 2023-09-12 RX ADMIN — Medication 1000 MILLIGRAM(S): at 01:00

## 2023-09-12 RX ADMIN — HEPARIN SODIUM 5000 UNIT(S): 5000 INJECTION INTRAVENOUS; SUBCUTANEOUS at 06:47

## 2023-09-12 NOTE — PROGRESS NOTE ADULT - ASSESSMENT
ASSESSMENT:  48y F s/p robotic assited hiatal hernia repair with partial fundoplication     PLAN:  NPO, IVF  NG tube to LCS  follow up AM esophagram  possible dc NG tube and barrios today   follow up dietician consult   pain control  incentive spirometry  DVT/GI prophylaxis  SCDs, IS  encourage ambulation      spectra [phone]

## 2023-09-12 NOTE — DISCHARGE NOTE PROVIDER - INSTRUCTIONS
First three days on bariatric clear liquids.  Day 4-7 on bariatric full liquids.  Start pureed diet after 1 week.

## 2023-09-12 NOTE — DISCHARGE NOTE PROVIDER - CARE PROVIDER_API CALL
Armani Trujillo  Thoracic and Cardiac Surgery  [address] [place] [address], [address] [address]  Phone: [phone]  Fax: [phone]  Follow Up Time:

## 2023-09-12 NOTE — PROGRESS NOTE ADULT - SUBJECTIVE AND OBJECTIVE BOX
GENERAL SURGERY PROGRESS NOTE    Patient: AWAIS PIMENTEL , 48y ([birth year])Female   MRN: [id number]  Location: [facility] PACU 009 A  Visit: 09-11-23 Inpatient  Date: 09-12-23 @ 01:39    Procedure/Dx/Injuries: s/p robotic assisted hiatal hernia repair with partial fundoplication     Events of past 24 hours: felling well post op, pain well controlled     PAST MEDICAL & SURGICAL HISTORY:  Obese      Tavarez's esophagus      Elevated liver enzymes      Asthma      Ex-cigarette smoker      Transfusion history      Hepatic steatosis      Acute gastric perforation  AFTER ERCP          Vitals:   T(F): 98.3 (09-11-23 @ 23:00), Max: 98.6 (09-11-23 @ 19:22)  HR: 99 (09-12-23 @ 01:00)  BP: 140/87 (09-12-23 @ 01:00)  RR: 17 (09-12-23 @ 01:00)  SpO2: 95% (09-12-23 @ 01:00)      Diet, NPO      Fluids: sodium chloride 0.9%.: Solution, 1000 milliLiter(s) infuse at 75 mL/Hr, Stop After 12 Hours  Provider's Contact #: [phone]      I & O's:      Bowel Movement: : [] YES [] NO  Flatus: : [] YES [] NO    PHYSICAL EXAM:  General: NAD, AAOx3, calm and cooperative  Cardiac: RRR S1, S2,  Respiratory: CTAB,   Abdomen: Soft, non-distended, non-tender,  Vascular: Pulses 2+ throughout, extremities well perfused  Incision/wound: 5 small abdominal incisions with dressings in place, clean, dry and intact    MEDICATIONS  (STANDING):  acetaminophen   IVPB .. 1000 milliGRAM(s) IV Intermittent once  acetaminophen   IVPB .. 1000 milliGRAM(s) IV Intermittent once  acetaminophen   IVPB .. 1000 milliGRAM(s) IV Intermittent once  clindamycin IVPB 900 milliGRAM(s) IV Intermittent every 8 hours  heparin   Injectable 5000 Unit(s) SubCutaneous every 8 hours  HYDROmorphone PCA (1 mG/mL) 30 milliLiter(s) PCA Continuous PCA Continuous  meperidine     Injectable 25 milliGRAM(s) IV Push once  pantoprazole  Injectable 40 milliGRAM(s) IV Push daily  simethicone 80 milliGRAM(s) Chew every 8 hours  sodium chloride 0.9%. 1000 milliLiter(s) (75 mL/Hr) IV Continuous <Continuous>  sucralfate suspension 1 Gram(s) Oral every 12 hours    MEDICATIONS  (PRN):  albuterol    90 MICROgram(s) HFA Inhaler 1 Puff(s) Inhalation every 6 hours PRN Shortness of Breath and/or Wheezing  HYDROmorphone  Injectable 0.4 milliGRAM(s) IV Push every 10 minutes PRN Moderate Pain (4 - 6)  naloxone Injectable 0.1 milliGRAM(s) IV Push every 3 minutes PRN For ANY of the following changes in patient status:  A. RR LESS THAN 10 breaths per minute, B. Oxygen saturation LESS THAN 90%, C. Sedation score of 6  ondansetron Injectable 4 milliGRAM(s) IV Push every 4 hours PRN Nausea and/or Vomiting  ondansetron Injectable 4 milliGRAM(s) IV Push every 6 hours PRN Nausea      DVT PROPHYLAXIS: heparin   Injectable 5000 Unit(s) SubCutaneous every 8 hours    GI PROPHYLAXIS: pantoprazole  Injectable 40 milliGRAM(s) IV Push daily    ANTICOAGULATION:   ANTIBIOTICS:  clindamycin IVPB 900 milliGRAM(s)            LAB/STUDIES:  Labs:  CAPILLARY BLOOD GLUCOSE                              14.0   9.64  )-----------( 221      ( 11 Sep 2023 20:07 )             41.7       Auto Neutrophil %: 87.1 % (09-11-23 @ 20:07)  Auto Immature Granulocyte %: 1.5 % (09-11-23 @ 20:07)    09-11    137  |  102  |  19  ----------------------------<  147<H>  4.8   |  18  |  1.0      Calcium: 8.9 mg/dL (09-11-23 @ 20:07)      LFTs:             7.1  | 0.8  | 31       ------------------[94      ( 11 Sep 2023 20:07 )  4.2  | x    | 21          Lipase:x      Amylase:x             Coags:            Urinalysis Basic - ( 11 Sep 2023 20:07 )    Color: x / Appearance: x / SG: x / pH: x  Gluc: 147 mg/dL / Ketone: x  / Bili: x / Urobili: x   Blood: x / Protein: x / Nitrite: x   Leuk Esterase: x / RBC: x / WBC x   Sq Epi: x / Non Sq Epi: x / Bacteria: x                IMAGING:      ACCESS/ DEVICES:  [ ] Peripheral IV  [ ] Central Venous Line	[ ] R	[ ] L	[ ] IJ	[ ] Fem	[ ] SC	Placed:   [ ] Arterial Line		[ ] R	[ ] L	[ ] Fem	[ ] Rad	[ ] Ax	Placed:   [ ] PICC:					[ ] Mediport  [ ] Urinary Catheter,  Date Placed:   [ ] Chest tube: [ ] Right, [ ] Left  [ ] DANA/Cesar Drains

## 2023-09-12 NOTE — DISCHARGE NOTE PROVIDER - HOSPITAL COURSE
A 49 Y/O female with PMH of morbidly obesity, Tavarez's esophagus, elevated liver enzymes (2015), AST/, complicated ERCP (perforation, pancreatitis, s/p 8 units of blood transfusion) in 2017 at [facility], liver cyst, ex-smoker, asthma presented for pretesting to electively undergo robot-assisted hiatal hernia repair and Nissen fundoplication secondary to hiatal hernia, GERD and regurgitation at night. Patient denied any chest pain, shortness of breath, palpitations, fever, cough, URI, abdominal pains, N/V, UTI, rashes or open wounds. As per patient exercise tolerance was 3-4 flight of stairs without shortness of breath. Patient had COVID x 2 ( Last 07/2022). Patient denied any signs and symptoms of COVID-19 and reported no contact with known positives. Patient was instructed to continue to self monitor and report any concerns to MD. Patient continued to practice self isolation and exposure control measures pre op.    On 9/11, she underwent robot-assisted hiatal hernia repair and Nissen fundoplication as scheduled. Intraoperatively, high mediastinal dissection of esophagus was done, which was brought down into the abdomen, 300 degree fundoplication of the stomach was performed, and right and left sylvie were primarily approximated with 0 ethibond suture x 5.     Postoperatively, she remained in PACU for further hemodynamic monitoring. Post-operatively, she had an NGT and barrios in place which was discontinued in the morning of 9/12. She underwent an upper GI series which showed no leak and therefore she was started on a bariatric clears diet which she tolerated. She was also seen by a registered dietician who educated the patient about an appropriate diet regimen to follow. Currently, the patient is hemodynamically stable and therefore it is medically appropriate to discharge the patient.

## 2023-09-12 NOTE — DISCHARGE NOTE PROVIDER - NSDCCPTREATMENT_GEN_ALL_CORE_FT
PRINCIPAL PROCEDURE  Procedure: Repair, hiatal hernia, robot-assisted  Findings and Treatment: Diet:    - Have bariatric clear liquids for three days, bariatric full liquids for four days after that, and then pureed diet after one week.    - Please avoid caffeine (including chocolate), highly acidic foods/beverages, carbonated beverages, high fat or high fiber foods.    - Have small frequents meals and snacks.    - Avoid use of straws.  Activity:    - Avoid heavy lifting or straining (anything over 10-15 pounds) for at least 6 weeks.    - You may ambulate and otherwise resume normal daily activities as tolerated.    - Your abdominal binder may help with some discomfort while ambulating.    - Please take home your incentive spirometer and continue to use 10x/hour while awake.  Incisions:    - You may remove your dressings.    - You may shower and allow soap and water to rinse over incisions.    - Please avoid scrubbing the areas and do not submerge your incisions in water for at least 2 weeks.  Medications:    - New medications: (Sent to your [facility] pharmacy)          *Semethicone and Zofran          *Please  these medications on your way home   - You may resume your home medications.    - You may take Tylenol 650mg (liquid) every 6 hours and alternate with Ibuprofen 600mg (liquid) every 8 hours for pain. You may find these medications over the counter at your local pharmacy.  Follow-up:    - Please call the office to schedule a follow-up appointment with Dr. Matteo Whitman in 1 week, or follow-up as previously scheduled.  Please call the office or return to the ED with persistent fever greater than 100.4F, chest pain, shortness of breath, uncontrollable nausea/vomiting/abdominal pain, constipation, bloody bowel movements, abdominal distention, inability to tolerate oral intake.        SECONDARY PROCEDURE  Procedure: Nissen fundoplication  Findings and Treatment: 300 degreestap blo

## 2023-09-12 NOTE — CHART NOTE - NSCHARTNOTEFT_GEN_A_CORE
Nutrition Education Note     Received consult for: s/p hiatal hernia repair w/fundoplication. Nissen diet education.     Pt seen in PACU, family at bedside present for diet education. Reviewed phases of diet and discussed tentative timeline for advancement as tolerated; starting with Clear Liquids (days 1-3) -> Full Liquids (days 4-7) -> Puree (days 8-14).   Reviewed GI irritants and encouraged pt to avoid: no caffeine (including chocolate), no highly acidic foods/beverages, no carbonated beverages, no high fat or high fiber foods. Encouraged small frequents meals and snacks. Avoid use of straws. Discussed oral nutrition supplements available OTC to assist with meeting nutrition requirements if PO intake is suboptimal (suggest Ensure Max Protein as needed once on Full Liquids or Puree). Provided pt with printed educational handouts for each phase of diet. Reviewed GI signs/symptoms to self monitor and encouraged to advance diet only as tolerated within tentative timeframes provided. All nutrition-related questions addressed at this time.     Please re-consult as needed.   RD to remain available: sung Galarza x [phone] or TEAMS

## 2023-09-12 NOTE — DISCHARGE NOTE NURSING/CASE MANAGEMENT/SOCIAL WORK - NSDCPEFALRISK_GEN_ALL_CORE
For information on Fall & Injury Prevention, visit: [email or web address] OR  [email or web address] OR  https://www.cdc.gov/steadi/patient.html

## 2023-09-12 NOTE — DISCHARGE NOTE PROVIDER - NSDCMRMEDTOKEN_GEN_ALL_CORE_FT
omeprazole 40 mg oral delayed release capsule: 1 cap(s) orally once a day  ondansetron 4 mg/5 mL oral solution: 5 milliliter(s) orally every 6 hours as needed for  nausea  simethicone 80 mg oral tablet, chewable: 1 tab(s) orally every 8 hours  Wegovy (1.7 mg dose) subcutaneous solution: 1.7 subcutaneously once a week Every MONDAY. Skip it on the DOS

## 2023-09-12 NOTE — DISCHARGE NOTE NURSING/CASE MANAGEMENT/SOCIAL WORK - PATIENT PORTAL LINK FT
You can access the FollowMyHealth Patient Portal offered by [facility] by registering at the following website: [email or web address]. By joining [employer]’s FollowMyHealth portal, you will also be able to view your health information using other applications (apps) compatible with our system.

## 2023-09-13 ENCOUNTER — TRANSCRIPTION ENCOUNTER (OUTPATIENT)
Age: 48
End: 2023-09-13

## 2023-09-13 ENCOUNTER — APPOINTMENT (OUTPATIENT)
Dept: CARE COORDINATION | Facility: HOME HEALTH | Age: 48
End: 2023-09-13
Payer: COMMERCIAL

## 2023-09-13 DIAGNOSIS — J45.909 UNSPECIFIED ASTHMA, UNCOMPLICATED: ICD-10-CM

## 2023-09-13 DIAGNOSIS — K44.9 DIAPHRAGMATIC HERNIA W/OUT OBSTRUCTION OR GANGRENE: ICD-10-CM

## 2023-09-13 PROCEDURE — 99495 TRANSJ CARE MGMT MOD F2F 14D: CPT | Mod: 95

## 2023-09-13 RX ORDER — HYDROCORTISONE 2.5% 25 MG/G
2.5 CREAM TOPICAL DAILY
Qty: 1 | Refills: 0 | Status: COMPLETED | COMMUNITY
Start: 2022-09-06 | End: 2023-09-13

## 2023-09-13 RX ORDER — PREDNISONE 20 MG/1
20 TABLET ORAL
Qty: 18 | Refills: 0 | Status: COMPLETED | COMMUNITY
Start: 2022-04-20 | End: 2023-09-13

## 2023-09-13 RX ORDER — POLYETHYLENE GLYCOL 3350 17 G/17G
17 POWDER, FOR SOLUTION ORAL
Qty: 1 | Refills: 0 | Status: COMPLETED | COMMUNITY
Start: 2023-09-07 | End: 2023-09-13

## 2023-09-13 RX ORDER — SEMAGLUTIDE 1.34 MG/ML
4 INJECTION, SOLUTION SUBCUTANEOUS
Qty: 4 | Refills: 2 | Status: COMPLETED | COMMUNITY
Start: 2022-10-26 | End: 2023-09-13

## 2023-09-13 RX ORDER — SEMAGLUTIDE 0.5 MG/.5ML
0.5 INJECTION, SOLUTION SUBCUTANEOUS
Qty: 4 | Refills: 1 | Status: COMPLETED | COMMUNITY
Start: 2022-05-11 | End: 2023-09-13

## 2023-09-13 RX ORDER — SEMAGLUTIDE 1.7 MG/.75ML
1.7 INJECTION, SOLUTION SUBCUTANEOUS
Qty: 1 | Refills: 0 | Status: COMPLETED | COMMUNITY
Start: 2023-05-08 | End: 2023-09-13

## 2023-09-15 ENCOUNTER — TRANSCRIPTION ENCOUNTER (OUTPATIENT)
Age: 48
End: 2023-09-15

## 2023-09-15 DIAGNOSIS — Z00.00 ENCOUNTER FOR GENERAL ADULT MEDICAL EXAMINATION W/OUT ABNORMAL FINDINGS: ICD-10-CM

## 2023-09-18 ENCOUNTER — OUTPATIENT (OUTPATIENT)
Dept: OUTPATIENT SERVICES | Facility: HOSPITAL | Age: 48
LOS: 1 days | End: 2023-09-18
Payer: COMMERCIAL

## 2023-09-18 ENCOUNTER — RESULT REVIEW (OUTPATIENT)
Age: 48
End: 2023-09-18

## 2023-09-18 DIAGNOSIS — R06.02 SHORTNESS OF BREATH: ICD-10-CM

## 2023-09-18 DIAGNOSIS — K25.1 ACUTE GASTRIC ULCER WITH PERFORATION: Chronic | ICD-10-CM

## 2023-09-18 PROCEDURE — 71046 X-RAY EXAM CHEST 2 VIEWS: CPT | Mod: 26

## 2023-09-18 PROCEDURE — 71046 X-RAY EXAM CHEST 2 VIEWS: CPT

## 2023-09-19 ENCOUNTER — APPOINTMENT (OUTPATIENT)
Dept: CARDIOTHORACIC SURGERY | Facility: CLINIC | Age: 48
End: 2023-09-19
Payer: COMMERCIAL

## 2023-09-19 VITALS
BODY MASS INDEX: 38.08 KG/M2 | HEIGHT: 71 IN | WEIGHT: 272 LBS | OXYGEN SATURATION: 96 % | DIASTOLIC BLOOD PRESSURE: 81 MMHG | HEART RATE: 72 BPM | RESPIRATION RATE: 14 BRPM | SYSTOLIC BLOOD PRESSURE: 114 MMHG

## 2023-09-19 DIAGNOSIS — R06.02 SHORTNESS OF BREATH: ICD-10-CM

## 2023-09-19 PROCEDURE — 99024 POSTOP FOLLOW-UP VISIT: CPT

## 2023-09-20 ENCOUNTER — TRANSCRIPTION ENCOUNTER (OUTPATIENT)
Age: 48
End: 2023-09-20

## 2023-09-26 ENCOUNTER — TRANSCRIPTION ENCOUNTER (OUTPATIENT)
Age: 48
End: 2023-09-26

## 2023-09-26 ENCOUNTER — APPOINTMENT (OUTPATIENT)
Dept: CARDIOTHORACIC SURGERY | Facility: CLINIC | Age: 48
End: 2023-09-26

## 2023-09-27 ENCOUNTER — NON-APPOINTMENT (OUTPATIENT)
Age: 48
End: 2023-09-27

## 2023-10-03 ENCOUNTER — APPOINTMENT (OUTPATIENT)
Dept: CARDIOTHORACIC SURGERY | Facility: CLINIC | Age: 48
End: 2023-10-03
Payer: COMMERCIAL

## 2023-10-03 ENCOUNTER — TRANSCRIPTION ENCOUNTER (OUTPATIENT)
Age: 48
End: 2023-10-03

## 2023-10-03 DIAGNOSIS — Z98.890 OTHER SPECIFIED POSTPROCEDURAL STATES: ICD-10-CM

## 2023-10-03 DIAGNOSIS — Z87.19 OTHER SPECIFIED POSTPROCEDURAL STATES: ICD-10-CM

## 2023-10-03 PROCEDURE — 99442: CPT

## 2023-10-06 PROBLEM — Z98.890 S/P REPAIR OF PARAESOPHAGEAL HERNIA: Status: ACTIVE | Noted: 2023-09-19

## 2023-10-09 ENCOUNTER — EMERGENCY (EMERGENCY)
Facility: HOSPITAL | Age: 48
LOS: 0 days | Discharge: HOME | End: 2023-10-09
Attending: EMERGENCY MEDICINE
Payer: COMMERCIAL

## 2023-10-09 VITALS
WEIGHT: 240.08 LBS | DIASTOLIC BLOOD PRESSURE: 86 MMHG | OXYGEN SATURATION: 98 % | SYSTOLIC BLOOD PRESSURE: 147 MMHG | HEART RATE: 74 BPM | HEIGHT: 71 IN | TEMPERATURE: 98 F | RESPIRATION RATE: 15 BRPM

## 2023-10-09 DIAGNOSIS — K25.1 ACUTE GASTRIC ULCER WITH PERFORATION: Chronic | ICD-10-CM

## 2023-10-09 DIAGNOSIS — K76.0 FATTY (CHANGE OF) LIVER, NOT ELSEWHERE CLASSIFIED: ICD-10-CM

## 2023-10-09 DIAGNOSIS — Z98.890 OTHER SPECIFIED POSTPROCEDURAL STATES: ICD-10-CM

## 2023-10-09 DIAGNOSIS — Z87.891 PERSONAL HISTORY OF NICOTINE DEPENDENCE: ICD-10-CM

## 2023-10-09 DIAGNOSIS — J45.909 UNSPECIFIED ASTHMA, UNCOMPLICATED: ICD-10-CM

## 2023-10-09 DIAGNOSIS — R10.13 EPIGASTRIC PAIN: ICD-10-CM

## 2023-10-09 DIAGNOSIS — R31.9 HEMATURIA, UNSPECIFIED: ICD-10-CM

## 2023-10-09 DIAGNOSIS — K22.70 BARRETT'S ESOPHAGUS WITHOUT DYSPLASIA: ICD-10-CM

## 2023-10-09 DIAGNOSIS — R19.7 DIARRHEA, UNSPECIFIED: ICD-10-CM

## 2023-10-09 DIAGNOSIS — Z88.0 ALLERGY STATUS TO PENICILLIN: ICD-10-CM

## 2023-10-09 LAB
ALBUMIN SERPL ELPH-MCNC: 4.5 G/DL — SIGNIFICANT CHANGE UP (ref 3.5–5.2)
ALP SERPL-CCNC: 91 U/L — SIGNIFICANT CHANGE UP (ref 30–115)
ALT FLD-CCNC: 15 U/L — SIGNIFICANT CHANGE UP (ref 0–41)
ANION GAP SERPL CALC-SCNC: 12 MMOL/L — SIGNIFICANT CHANGE UP (ref 7–14)
APPEARANCE UR: CLEAR — SIGNIFICANT CHANGE UP
AST SERPL-CCNC: 22 U/L — SIGNIFICANT CHANGE UP (ref 0–41)
BACTERIA # UR AUTO: ABNORMAL /HPF
BASOPHILS # BLD AUTO: 0.04 K/UL — SIGNIFICANT CHANGE UP (ref 0–0.2)
BASOPHILS NFR BLD AUTO: 0.6 % — SIGNIFICANT CHANGE UP (ref 0–1)
BILIRUB SERPL-MCNC: 0.8 MG/DL — SIGNIFICANT CHANGE UP (ref 0.2–1.2)
BILIRUB UR-MCNC: ABNORMAL
BUN SERPL-MCNC: 16 MG/DL — SIGNIFICANT CHANGE UP (ref 10–20)
CALCIUM SERPL-MCNC: 9.2 MG/DL — SIGNIFICANT CHANGE UP (ref 8.4–10.5)
CAST: 10 /LPF — HIGH (ref 0–4)
CHLORIDE SERPL-SCNC: 106 MMOL/L — SIGNIFICANT CHANGE UP (ref 98–110)
CO2 SERPL-SCNC: 22 MMOL/L — SIGNIFICANT CHANGE UP (ref 17–32)
COLOR SPEC: SIGNIFICANT CHANGE UP
CREAT SERPL-MCNC: 0.7 MG/DL — SIGNIFICANT CHANGE UP (ref 0.7–1.5)
DIFF PNL FLD: ABNORMAL
EGFR: 106 ML/MIN/1.73M2 — SIGNIFICANT CHANGE UP
EOSINOPHIL # BLD AUTO: 0.05 K/UL — SIGNIFICANT CHANGE UP (ref 0–0.7)
EOSINOPHIL NFR BLD AUTO: 0.8 % — SIGNIFICANT CHANGE UP (ref 0–8)
GLUCOSE SERPL-MCNC: 89 MG/DL — SIGNIFICANT CHANGE UP (ref 70–99)
GLUCOSE UR QL: NEGATIVE MG/DL — SIGNIFICANT CHANGE UP
HCG SERPL QL: NEGATIVE — SIGNIFICANT CHANGE UP
HCT VFR BLD CALC: 39.3 % — SIGNIFICANT CHANGE UP (ref 37–47)
HGB BLD-MCNC: 13.5 G/DL — SIGNIFICANT CHANGE UP (ref 12–16)
HYALINE CASTS # UR AUTO: 10 /LPF — HIGH (ref 0–4)
IMM GRANULOCYTES NFR BLD AUTO: 0.6 % — HIGH (ref 0.1–0.3)
KETONES UR-MCNC: ABNORMAL MG/DL
LACTATE SERPL-SCNC: 0.8 MMOL/L — SIGNIFICANT CHANGE UP (ref 0.7–2)
LEUKOCYTE ESTERASE UR-ACNC: NEGATIVE — SIGNIFICANT CHANGE UP
LIDOCAIN IGE QN: 16 U/L — SIGNIFICANT CHANGE UP (ref 7–60)
LYMPHOCYTES # BLD AUTO: 2.94 K/UL — SIGNIFICANT CHANGE UP (ref 1.2–3.4)
LYMPHOCYTES # BLD AUTO: 45.7 % — SIGNIFICANT CHANGE UP (ref 20.5–51.1)
MCHC RBC-ENTMCNC: 30.6 PG — SIGNIFICANT CHANGE UP (ref 27–31)
MCHC RBC-ENTMCNC: 34.4 G/DL — SIGNIFICANT CHANGE UP (ref 32–37)
MCV RBC AUTO: 89.1 FL — SIGNIFICANT CHANGE UP (ref 81–99)
MONOCYTES # BLD AUTO: 0.51 K/UL — SIGNIFICANT CHANGE UP (ref 0.1–0.6)
MONOCYTES NFR BLD AUTO: 7.9 % — SIGNIFICANT CHANGE UP (ref 1.7–9.3)
NEUTROPHILS # BLD AUTO: 2.86 K/UL — SIGNIFICANT CHANGE UP (ref 1.4–6.5)
NEUTROPHILS NFR BLD AUTO: 44.4 % — SIGNIFICANT CHANGE UP (ref 42.2–75.2)
NITRITE UR-MCNC: NEGATIVE — SIGNIFICANT CHANGE UP
NRBC # BLD: 0 /100 WBCS — SIGNIFICANT CHANGE UP (ref 0–0)
PH UR: 5.5 — SIGNIFICANT CHANGE UP (ref 5–8)
PLATELET # BLD AUTO: 220 K/UL — SIGNIFICANT CHANGE UP (ref 130–400)
PMV BLD: 9.7 FL — SIGNIFICANT CHANGE UP (ref 7.4–10.4)
POTASSIUM SERPL-MCNC: 4 MMOL/L — SIGNIFICANT CHANGE UP (ref 3.5–5)
POTASSIUM SERPL-SCNC: 4 MMOL/L — SIGNIFICANT CHANGE UP (ref 3.5–5)
PROT SERPL-MCNC: 7.1 G/DL — SIGNIFICANT CHANGE UP (ref 6–8)
PROT UR-MCNC: 30 MG/DL
RBC # BLD: 4.41 M/UL — SIGNIFICANT CHANGE UP (ref 4.2–5.4)
RBC # FLD: 14.1 % — SIGNIFICANT CHANGE UP (ref 11.5–14.5)
RBC CASTS # UR COMP ASSIST: 25 /HPF — HIGH (ref 0–4)
SODIUM SERPL-SCNC: 140 MMOL/L — SIGNIFICANT CHANGE UP (ref 135–146)
SP GR SPEC: >1.03 — HIGH (ref 1–1.03)
SQUAMOUS # UR AUTO: 7 /HPF — HIGH (ref 0–5)
UROBILINOGEN FLD QL: 1 MG/DL — SIGNIFICANT CHANGE UP (ref 0.2–1)
WBC # BLD: 6.44 K/UL — SIGNIFICANT CHANGE UP (ref 4.8–10.8)
WBC # FLD AUTO: 6.44 K/UL — SIGNIFICANT CHANGE UP (ref 4.8–10.8)
WBC UR QL: 3 /HPF — SIGNIFICANT CHANGE UP (ref 0–5)

## 2023-10-09 PROCEDURE — 71260 CT THORAX DX C+: CPT | Mod: MA

## 2023-10-09 PROCEDURE — 96375 TX/PRO/DX INJ NEW DRUG ADDON: CPT | Mod: XU

## 2023-10-09 PROCEDURE — 84703 CHORIONIC GONADOTROPIN ASSAY: CPT

## 2023-10-09 PROCEDURE — 74177 CT ABD & PELVIS W/CONTRAST: CPT | Mod: 26,MA

## 2023-10-09 PROCEDURE — 96374 THER/PROPH/DIAG INJ IV PUSH: CPT | Mod: XU

## 2023-10-09 PROCEDURE — 71045 X-RAY EXAM CHEST 1 VIEW: CPT

## 2023-10-09 PROCEDURE — 80053 COMPREHEN METABOLIC PANEL: CPT

## 2023-10-09 PROCEDURE — 87086 URINE CULTURE/COLONY COUNT: CPT

## 2023-10-09 PROCEDURE — 99285 EMERGENCY DEPT VISIT HI MDM: CPT

## 2023-10-09 PROCEDURE — 99284 EMERGENCY DEPT VISIT MOD MDM: CPT | Mod: 25

## 2023-10-09 PROCEDURE — 71260 CT THORAX DX C+: CPT | Mod: 26,MA

## 2023-10-09 PROCEDURE — 81001 URINALYSIS AUTO W/SCOPE: CPT

## 2023-10-09 PROCEDURE — 74177 CT ABD & PELVIS W/CONTRAST: CPT | Mod: MA

## 2023-10-09 PROCEDURE — 85025 COMPLETE CBC W/AUTO DIFF WBC: CPT

## 2023-10-09 PROCEDURE — 83605 ASSAY OF LACTIC ACID: CPT

## 2023-10-09 PROCEDURE — 71045 X-RAY EXAM CHEST 1 VIEW: CPT | Mod: 26

## 2023-10-09 PROCEDURE — 36415 COLL VENOUS BLD VENIPUNCTURE: CPT

## 2023-10-09 PROCEDURE — 83690 ASSAY OF LIPASE: CPT

## 2023-10-09 RX ORDER — KETOROLAC TROMETHAMINE 30 MG/ML
15 SYRINGE (ML) INJECTION ONCE
Refills: 0 | Status: DISCONTINUED | OUTPATIENT
Start: 2023-10-09 | End: 2023-10-09

## 2023-10-09 RX ORDER — FAMOTIDINE 10 MG/ML
20 INJECTION INTRAVENOUS ONCE
Refills: 0 | Status: COMPLETED | OUTPATIENT
Start: 2023-10-09 | End: 2023-10-09

## 2023-10-09 RX ORDER — SODIUM CHLORIDE 9 MG/ML
1000 INJECTION, SOLUTION INTRAVENOUS ONCE
Refills: 0 | Status: COMPLETED | OUTPATIENT
Start: 2023-10-09 | End: 2023-10-09

## 2023-10-09 RX ORDER — IOHEXOL 300 MG/ML
30 INJECTION, SOLUTION INTRAVENOUS ONCE
Refills: 0 | Status: COMPLETED | OUTPATIENT
Start: 2023-10-09 | End: 2023-10-09

## 2023-10-09 RX ADMIN — IOHEXOL 30 MILLILITER(S): 300 INJECTION, SOLUTION INTRAVENOUS at 14:49

## 2023-10-09 RX ADMIN — SODIUM CHLORIDE 1000 MILLILITER(S): 9 INJECTION, SOLUTION INTRAVENOUS at 14:49

## 2023-10-09 RX ADMIN — FAMOTIDINE 20 MILLIGRAM(S): 10 INJECTION INTRAVENOUS at 14:48

## 2023-10-09 RX ADMIN — Medication 15 MILLIGRAM(S): at 14:48

## 2023-10-09 NOTE — ED PROVIDER NOTE - CLINICAL SUMMARY MEDICAL DECISION MAKING FREE TEXT BOX
Received sign out from Dr. Sanderson. Recent hiatal hernia repair 1 month ago by Dr. Matteo Whitman. Coming in with hematuria and worsening abdominal pain. labs, ua, ct done. no acute findings. + blood in her urine, no UTI. no urinary symptoms. Surgery consulted who clears patient for discharge and outpatient follow up with surgery. Patient is agreeable with plan. Feeling better. Results discussed. Labs  were ordered and reviewed.  Imaging was ordered and reviewed by me.  Appropriate medications for patient's presenting complaints were ordered and effects were reassessed.  Patient's records (prior hospital, ED visit, and/or nursing home notes if available) were reviewed.  Additional history was obtained from EMS, family, and/or PCP (where available).  Escalation to admission/observation was considered. However patient feels much better and is comfortable with discharge.  Appropriate follow-up was arranged.

## 2023-10-09 NOTE — ED PROVIDER NOTE - ATTENDING CONTRIBUTION TO CARE
48-year-old female history of recent hiatal hernia repair and Nissen fundoplication presenting for evaluation of epigastric abdominal pain getting progressively worse and surgery.  States that pain radiates to back, today also complaining of generalized weakness, fatigue and darker urine. + Loose stools, no vomiting.  No chest pain.  Uncomfortable appearing, NAD, non toxic. NCAT PERRLA EOMI dry mucous membranes neck supple non tender normal wob cta bl rrr abdomen s epigastric abdominal tenderness palpation nd no rebound no guarding WWPx4 neuro non focal

## 2023-10-09 NOTE — ED ADULT NURSE NOTE - NSFALLUNIVINTERV_ED_ALL_ED
Bed/Stretcher in lowest position, wheels locked, appropriate side rails in place/Call bell, personal items and telephone in reach/Instruct patient to call for assistance before getting out of bed/chair/stretcher/Non-slip footwear applied when patient is off stretcher/[place] to call system/Physically safe environment - no spills, clutter or unnecessary equipment/Purposeful proactive rounding/Room/bathroom lighting operational, light cord in reach

## 2023-10-09 NOTE — ED PROVIDER NOTE - NSFOLLOWUPINSTRUCTIONS_ED_ALL_ED_FT
FOLLOW UP WITH DR ROBYN OLVERA AS DISCUSSED.    ~  Abdominal Pain    Many things can cause abdominal pain. Usually, abdominal pain is not caused by a disease and will improve without treatment. Your health care provider will do a physical exam and possibly order blood tests and imaging to help determine the seriousness of your pain. However, in many cases, no cause may be found and you may need further testing as an outpatient. Monitor your abdominal pain for any changes.     SEEK IMMEDIATE MEDICAL CARE IF YOU HAVE THE FOLLOWING SYMPTOMS: worsening abdominal pain, vomiting, diarrhea, inability to have bowel movements or pass gas, black or bloody stool, fever accompanying chest pain or back pain, or dizziness/lightheadedness.

## 2023-10-09 NOTE — ED PROVIDER NOTE - PROGRESS NOTE DETAILS
Received sign out from Dr. Sanderson pending imaging and reassessment. SS Surgery aware of imaging results, patient requesting to leave prior to surgery evaluation in ED. Strict return precautions given

## 2023-10-09 NOTE — ED PROVIDER NOTE - PHYSICAL EXAMINATION
CONSTITUTIONAL: NAD  SKIN: Warm dry  HEAD: NCAT  EYES: NL inspection  ENT: MMM  NECK: Supple; non tender.  CARD: RRR  RESP: CTAB  ABD: soft no R/G, +epigastric ttp   EXT: no pedal edema  NEURO: Grossly unremarkable  PSYCH: Cooperative, appropriate.

## 2023-10-09 NOTE — ED PROVIDER NOTE - PATIENT PORTAL LINK FT
You can access the FollowMyHealth Patient Portal offered by [facility] by registering at the following website: [email or web address]. By joining [employer]’s FollowMyHealth portal, you will also be able to view your health information using other applications (apps) compatible with our system.

## 2023-10-09 NOTE — ED PROVIDER NOTE - CARE PROVIDER_API CALL
Armani Trujillo  Thoracic and Cardiac Surgery  [address] [place] [address], [address] [address]  Phone: [phone]  Fax: [phone]  Follow Up Time: 1-3 Days

## 2023-10-09 NOTE — CONSULT NOTE ADULT - ASSESSMENT
48F with PMH of obesity, Tavarez's esophagus, ERCP complicated by perforation, pancreatitis requiring 8u pRBC (2017, [facility]), liver cyst, ex-smoker, asthma, and hiatal hernia s/p recent robotic repair with nissen fundoplication 9/11/23 with Dr. Matteo Whitman, presents to the ED with abdominal pain and diarrhea.     PLAN:   - No acute surgical intervention   - Recommend treatment for possible UTI   - Recommend routine outpatient follow up with Dr. Matteo Whitman    Discussed plan with attending surgeon on call, Dr. Matteo Whitman  SPECTRA 1612

## 2023-10-09 NOTE — CONSULT NOTE ADULT - SUBJECTIVE AND OBJECTIVE BOX
THORACIC SURGERY CONSULT NOTE    Patient: AWAIS PIMENTEL , 48y ([birth year])Female   MRN: [id number]  Location: [facility] ED  Visit: 10-09-23 - 10-09-23 Emergency  Date: 10-09-23 @ 22:32    HPI:  48F with PMH of obesity, Tavarez's esophagus, ERCP complicated by perforation, pancreatitis requiring 8u pRBC (2017, [facility]), liver cyst, ex-smoker, asthma, and hiatal hernia s/p recent robotic repair with nissen fundoplication 9/11/23 with Dr. Matteo Whitman, presents to the ED with abdominal pain and diarrhea.     PAST MEDICAL & SURGICAL HISTORY:  Obese  Tavarez's esophagus  Elevated liver enzymes  Asthma  Ex-cigarette smoker  Transfusion history  Hepatic steatosis  Acute gastric perforation  AFTER ERCP    Home Medications:  omeprazole 40 mg oral delayed release capsule: 1 cap(s) orally once a day (11 Sep 2023 10:06)  Wegovy (1.7 mg dose) subcutaneous solution: 1.7 subcutaneously once a week Every MONDAY. Skip it on the DOS (11 Sep 2023 10:06)    VITALS:  T(F): 98.4 (10-09-23 @ 12:05), Max: 98.4 (10-09-23 @ 12:05)  HR: 74 (10-09-23 @ 12:05) (74 - 74)  BP: 147/86 (10-09-23 @ 12:05) (147/86 - 147/86)  RR: 15 (10-09-23 @ 12:05) (15 - 15)  SpO2: 98% (10-09-23 @ 12:05) (98% - 98%)    MEDICATIONS  (STANDING):    MEDICATIONS  (PRN):    LAB/STUDIES:                        13.5   6.44  )-----------( 220      ( 09 Oct 2023 14:35 )             39.3     10-09    140  |  106  |  16  ----------------------------<  89  4.0   |  22  |  0.7    Ca    9.2      09 Oct 2023 14:35    TPro  7.1  /  Alb  4.5  /  TBili  0.8  /  DBili  x   /  AST  22  /  ALT  15  /  AlkPhos  91  10-09      LIVER FUNCTIONS - ( 09 Oct 2023 14:35 )  Alb: 4.5 g/dL / Pro: 7.1 g/dL / ALK PHOS: 91 U/L / ALT: 15 U/L / AST: 22 U/L / GGT: x           Urinalysis Basic - ( 09 Oct 2023 14:35 )    Color: Dark Yellow / Appearance: Clear / SG: >1.030 / pH: x  Gluc: 89 mg/dL / Ketone: Trace mg/dL  / Bili: Small / Urobili: 1.0 mg/dL   Blood: x / Protein: 30 mg/dL / Nitrite: Negative   Leuk Esterase: Negative / RBC: 25 /HPF / WBC 3 /HPF   Sq Epi: x / Non Sq Epi: 7 /HPF / Bacteria: Many /HPF    IMAGING:  < from: CT Abdomen and Pelvis w/ Oral Cont and w/ IV Cont (10.09.23 @ 16:36) >  IMPRESSION: No acute intrathoracic or intra-abdominal/pelvic abnormality.    Previously seen patchy groundglass opacities in the bilateral lower lobes   have resolved.

## 2023-10-10 ENCOUNTER — TRANSCRIPTION ENCOUNTER (OUTPATIENT)
Age: 48
End: 2023-10-10

## 2023-10-10 ENCOUNTER — NON-APPOINTMENT (OUTPATIENT)
Age: 48
End: 2023-10-10

## 2023-11-03 ENCOUNTER — RX RENEWAL (OUTPATIENT)
Age: 48
End: 2023-11-03

## 2023-11-13 ENCOUNTER — APPOINTMENT (OUTPATIENT)
Dept: GASTROENTEROLOGY | Facility: CLINIC | Age: 48
End: 2023-11-13
Payer: COMMERCIAL

## 2023-11-13 DIAGNOSIS — R19.7 DIARRHEA, UNSPECIFIED: ICD-10-CM

## 2023-11-13 PROCEDURE — 99443: CPT

## 2023-11-15 PROBLEM — R19.7 DIARRHEA: Status: ACTIVE | Noted: 2023-09-13

## 2023-11-20 RX ORDER — SEMAGLUTIDE 1 MG/.5ML
1 INJECTION, SOLUTION SUBCUTANEOUS
Qty: 4 | Refills: 1 | Status: DISCONTINUED | COMMUNITY
Start: 2023-09-27 | End: 2023-11-20

## 2023-12-13 ENCOUNTER — ASOB RESULT (OUTPATIENT)
Age: 48
End: 2023-12-13

## 2023-12-13 ENCOUNTER — APPOINTMENT (OUTPATIENT)
Dept: OBGYN | Facility: CLINIC | Age: 48
End: 2023-12-13
Payer: COMMERCIAL

## 2023-12-13 VITALS
HEIGHT: 71 IN | SYSTOLIC BLOOD PRESSURE: 124 MMHG | DIASTOLIC BLOOD PRESSURE: 82 MMHG | BODY MASS INDEX: 37.1 KG/M2 | HEART RATE: 81 BPM | WEIGHT: 265 LBS

## 2023-12-13 DIAGNOSIS — Z01.419 ENCOUNTER FOR GYNECOLOGICAL EXAMINATION (GENERAL) (ROUTINE) W/OUT ABNORMAL FINDINGS: ICD-10-CM

## 2023-12-13 DIAGNOSIS — N92.1 EXCESSIVE AND FREQUENT MENSTRUATION WITH IRREGULAR CYCLE: ICD-10-CM

## 2023-12-13 PROCEDURE — 99396 PREV VISIT EST AGE 40-64: CPT | Mod: 25

## 2023-12-13 PROCEDURE — ZZZZZ: CPT

## 2023-12-13 PROCEDURE — 76830 TRANSVAGINAL US NON-OB: CPT

## 2023-12-13 NOTE — PHYSICAL EXAM
[Appropriately responsive] : appropriately responsive [Alert] : alert [No Acute Distress] : no acute distress [No Lymphadenopathy] : no lymphadenopathy [Soft] : soft [Non-tender] : non-tender [Non-distended] : non-distended [No HSM] : No HSM [No Lesions] : no lesions [No Mass] : no mass [Oriented x3] : oriented x3 [Examination Of The Breasts] : a normal appearance [No Discharge] : no discharge [No Masses] : no breast masses were palpable [Labia Majora] : normal [Labia Minora] : normal [IUD String] : an IUD string was noted [Normal] : normal [Uterine Adnexae] : normal [No Bleeding] : There was no active vaginal bleeding [Nabothian Cyst ___ cm] : [unfilled] ~Ucm nabothian cyst

## 2023-12-13 NOTE — DISCUSSION/SUMMARY
[FreeTextEntry1] : 49 yo P2 annual , with liletta IUD, BTB/metrorrhagia  pap hpv mammo Pelvic sono-IUD in situ Weight loss discussed

## 2023-12-13 NOTE — HISTORY OF PRESENT ILLNESS
[Patient reported mammogram was normal] : Patient reported mammogram was normal [FreeTextEntry1] : 49 YO P-2 LMP- not getting menses  Lilletta  IUD   inserted 1-2020 is here for annual exam , felt something in vagina. Patient feels lump on her  cervix and was concerned. Had spotting last week  Patiently currently is on Wegovy for weight loss History of Tavarez's esophagus in the past Patient has Niesen fundoplication surgery done, reports diarrhea every time she eats since that time  Patient works with Dr. Evans the office  [TextBox_4] : [facility] No history of fibroids, cysts, or STDs [Mammogramdate] : 2022 [PapSmeardate] : 2022

## 2023-12-13 NOTE — PROCEDURE
[Cervical Pap Smear] : cervical Pap smear [Liquid Base] : liquid base [Tolerated Well] : the patient tolerated the procedure well [No Complications] : there were no complications [Locate IUD] : locate IUD [Abnormal Uterine Bleeding] : abnormal uterine bleeding [Transvaginal Ultrasound] : transvaginal ultrasound [FreeTextEntry4] : IUD inside, normal ovaries, suspected adenomyosis

## 2023-12-15 LAB — HPV HIGH+LOW RISK DNA PNL CVX: NOT DETECTED

## 2023-12-19 LAB — CYTOLOGY CVX/VAG DOC THIN PREP: NORMAL

## 2024-01-24 ENCOUNTER — APPOINTMENT (OUTPATIENT)
Dept: UROLOGY | Facility: CLINIC | Age: 49
End: 2024-01-24

## 2024-01-24 ENCOUNTER — TRANSCRIPTION ENCOUNTER (OUTPATIENT)
Age: 49
End: 2024-01-24

## 2024-02-16 ENCOUNTER — TRANSCRIPTION ENCOUNTER (OUTPATIENT)
Age: 49
End: 2024-02-16

## 2024-02-20 ENCOUNTER — APPOINTMENT (OUTPATIENT)
Dept: INTERNAL MEDICINE | Facility: CLINIC | Age: 49
End: 2024-02-20

## 2024-02-20 ENCOUNTER — OUTPATIENT (OUTPATIENT)
Dept: OUTPATIENT SERVICES | Facility: HOSPITAL | Age: 49
LOS: 1 days | End: 2024-02-20

## 2024-02-20 ENCOUNTER — NON-APPOINTMENT (OUTPATIENT)
Age: 49
End: 2024-02-20

## 2024-02-20 DIAGNOSIS — K25.1 ACUTE GASTRIC ULCER WITH PERFORATION: Chronic | ICD-10-CM

## 2024-02-21 DIAGNOSIS — E66.01 MORBID (SEVERE) OBESITY DUE TO EXCESS CALORIES: ICD-10-CM

## 2024-03-13 ENCOUNTER — APPOINTMENT (OUTPATIENT)
Dept: GASTROENTEROLOGY | Facility: CLINIC | Age: 49
End: 2024-03-13
Payer: COMMERCIAL

## 2024-03-13 VITALS
WEIGHT: 267 LBS | HEART RATE: 78 BPM | SYSTOLIC BLOOD PRESSURE: 128 MMHG | BODY MASS INDEX: 37.38 KG/M2 | DIASTOLIC BLOOD PRESSURE: 89 MMHG | HEIGHT: 71 IN

## 2024-03-13 DIAGNOSIS — R74.8 ABNORMAL LEVELS OF OTHER SERUM ENZYMES: ICD-10-CM

## 2024-03-13 DIAGNOSIS — K76.0 FATTY (CHANGE OF) LIVER, NOT ELSEWHERE CLASSIFIED: ICD-10-CM

## 2024-03-13 PROCEDURE — 91200 LIVER ELASTOGRAPHY: CPT

## 2024-03-13 PROCEDURE — 99215 OFFICE O/P EST HI 40 MIN: CPT

## 2024-03-13 RX ORDER — GABAPENTIN 300 MG/1
300 CAPSULE ORAL TWICE DAILY
Qty: 60 | Refills: 0 | Status: DISCONTINUED | COMMUNITY
Start: 2023-10-10 | End: 2024-03-13

## 2024-03-13 RX ORDER — SIMETHICONE 80 MG/1
80 TABLET, CHEWABLE ORAL
Refills: 0 | Status: DISCONTINUED | COMMUNITY
End: 2024-03-13

## 2024-03-13 RX ORDER — ALBUTEROL SULFATE 90 UG/1
108 (90 BASE) INHALANT RESPIRATORY (INHALATION)
Qty: 1 | Refills: 1 | Status: DISCONTINUED | COMMUNITY
Start: 2022-07-11 | End: 2024-03-13

## 2024-03-13 RX ORDER — SEMAGLUTIDE 1.7 MG/.75ML
1.7 INJECTION, SOLUTION SUBCUTANEOUS WEEKLY
Qty: 12 | Refills: 0 | Status: DISCONTINUED | COMMUNITY
Start: 2023-11-20 | End: 2024-03-13

## 2024-03-13 RX ORDER — PSYLLIUM SEED
48.57 PACKET (EA) ORAL
Qty: 1 | Refills: 2 | Status: DISCONTINUED | COMMUNITY
Start: 2023-11-15 | End: 2024-03-13

## 2024-03-13 RX ORDER — ONDANSETRON HYDROCHLORIDE 4 MG/5ML
4 SOLUTION ORAL
Refills: 0 | Status: DISCONTINUED | COMMUNITY
End: 2024-03-13

## 2024-03-13 RX ORDER — OXYCODONE 5 MG/1
5 TABLET ORAL EVERY 8 HOURS
Qty: 15 | Refills: 0 | Status: DISCONTINUED | COMMUNITY
Start: 2023-09-15 | End: 2024-03-13

## 2024-03-13 RX ORDER — OMEPRAZOLE 40 MG/1
40 CAPSULE, DELAYED RELEASE ORAL
Qty: 90 | Refills: 2 | Status: DISCONTINUED | COMMUNITY
Start: 2022-03-21 | End: 2024-03-13

## 2024-03-13 RX ORDER — OMEPRAZOLE 40 MG/1
40 CAPSULE, DELAYED RELEASE ORAL TWICE DAILY
Qty: 180 | Refills: 0 | Status: DISCONTINUED | COMMUNITY
Start: 2023-04-24 | End: 2024-03-13

## 2024-03-13 RX ORDER — IBUPROFEN 400 MG/1
400 TABLET ORAL EVERY 8 HOURS
Qty: 90 | Refills: 0 | Status: DISCONTINUED | COMMUNITY
Start: 2023-10-10 | End: 2024-03-13

## 2024-03-14 NOTE — HISTORY OF PRESENT ILLNESS
[FreeTextEntry1] : Endoscopy: Esophagus Lumen A large size hiatal hernia was seen, the esophagogastric  junction(EGJ) was noted at 38 cm, with hiatal narrowing at 34 cm from the  incisors. Retroflexion view in the stomach confirmed the size and morphology of  the hernia.    Mucosa Grade A esophagitis was seen in the gastroesophageal junction, compatible  with non-erosive esophagitis.Multiple cold forceps biopsies were performed for  histology.    Stomach Mucosa Diffuse erythema of the mucosa was noted in the stomach. These  findings are compatible with non-erosive gastritis.Multiple cold forceps  biopsies were performed for histology.    Duodenum Mucosa Normal mucosa was noted in the whole examined duodenum.    EGD Impressions:    Grade A esophagitis in the gastroesophageal junction compatible with  non-erosive esophagitis. (Biopsy).    Erythema in the stomach compatible with non-erosive gastritis. (Biopsy).    Normal mucosa in the whole examined duodenum.    Esophageal hiatal hernia., Performed Sept 2022.   Colonoscopy: Protruding lesions A single sessile 8 mm polyp of benign appearance was found in  the transverse colon.A single-piece polypectomy was performed using a cold  snare. The polyp was completely removed.    A single sessile 3 mm polyp was found in the rectum.A single-piece polypectomy  was performed using a cold forceps. The polyp was completely removed.    Small external hemorrhoids were noted.    Colonoscopy Impressions:    Polyp (8 mm) in the transverse colon. (Polypectomy).    Polyp (3 mm) in the rectum. (Polypectomy).    External hemorrhoids., Performed Sept 2022.   [de-identified] :  Patient reports that in Dec 2015 she was extremely sick with elevated liver enzymes. She had severe fatigue and jaundice. Tests revealed AST ALT in 900s and she had ERCP complicated by perforation and pancreatitis. Jaundice resolved after a prolonged hospital stay at [facility]. She has monitoring of liver tests every 6 months and AST ALT are in the 40s. She was 250 lb for many years prior and has been 275 in the past year. No Hx of HTN HLD DM. No hx of supplement use, herbal teas or antibiotics. Was on OC pills in her teens. Patient also states that she has a cyst on her liver.  Had robot assisted hiatal hernia repair and Nissen fundoplication September 11 2023. Had difficulty post surgery.  Was off semaglutide and resumed in Oct 2023. Had lost wt from 308 lb to 280 lb.  Lost 13  lb post surgery down to 267 lb.  has been on  2.4 mg weekly but has not losing any wt since Dec 2023.  Has diarrhea since hiatal hernia surgery. No abdominal pain.  Diet not always careful. Not been exercising.   Tolerating semaglutide. No side effects. Has chronic regurgitation and reports having hiatal hernia surgery in September. Has lost 26 lb in total but none since Oct 2022. Dose was increased to 1.7 mg weekly in May. Not losing wt now. Has not been careful with diet. Not exercising much. No abdominal pain jaundice itching confusion hematemesis or melena. No thyroid issues. No liver disease or liver cancer in family. No family hx of any cancer including medullary cancer of thyroid or MEN

## 2024-03-14 NOTE — HISTORY OF PRESENT ILLNESS
ID band present and verified. [FreeTextEntry1] : Endoscopy: Esophagus Lumen A large size hiatal hernia was seen, the esophagogastric  junction(EGJ) was noted at 38 cm, with hiatal narrowing at 34 cm from the  incisors. Retroflexion view in the stomach confirmed the size and morphology of  the hernia.    Mucosa Grade A esophagitis was seen in the gastroesophageal junction, compatible  with non-erosive esophagitis.Multiple cold forceps biopsies were performed for  histology.    Stomach Mucosa Diffuse erythema of the mucosa was noted in the stomach. These  findings are compatible with non-erosive gastritis.Multiple cold forceps  biopsies were performed for histology.    Duodenum Mucosa Normal mucosa was noted in the whole examined duodenum.    EGD Impressions:    Grade A esophagitis in the gastroesophageal junction compatible with  non-erosive esophagitis. (Biopsy).    Erythema in the stomach compatible with non-erosive gastritis. (Biopsy).    Normal mucosa in the whole examined duodenum.    Esophageal hiatal hernia., Performed Sept 2022.   Colonoscopy: Protruding lesions A single sessile 8 mm polyp of benign appearance was found in  the transverse colon.A single-piece polypectomy was performed using a cold  snare. The polyp was completely removed.    A single sessile 3 mm polyp was found in the rectum.A single-piece polypectomy  was performed using a cold forceps. The polyp was completely removed.    Small external hemorrhoids were noted.    Colonoscopy Impressions:    Polyp (8 mm) in the transverse colon. (Polypectomy).    Polyp (3 mm) in the rectum. (Polypectomy).    External hemorrhoids., Performed Sept 2022.   [de-identified] :  Patient reports that in Dec 2015 she was extremely sick with elevated liver enzymes. She had severe fatigue and jaundice. Tests revealed AST ALT in 900s and she had ERCP complicated by perforation and pancreatitis. Jaundice resolved after a prolonged hospital stay at [facility]. She has monitoring of liver tests every 6 months and AST ALT are in the 40s. She was 250 lb for many years prior and has been 275 in the past year. No Hx of HTN HLD DM. No hx of supplement use, herbal teas or antibiotics. Was on OC pills in her teens. Patient also states that she has a cyst on her liver.  Had robot assisted hiatal hernia repair and Nissen fundoplication September 11 2023. Had difficulty post surgery.  Was off semaglutide and resumed in Oct 2023. Had lost wt from 308 lb to 280 lb.  Lost 13  lb post surgery down to 267 lb.  has been on  2.4 mg weekly but has not losing any wt since Dec 2023.  Has diarrhea since hiatal hernia surgery. No abdominal pain.  Diet not always careful. Not been exercising.   Tolerating semaglutide. No side effects. Has chronic regurgitation and reports having hiatal hernia surgery in September. Has lost 26 lb in total but none since Oct 2022. Dose was increased to 1.7 mg weekly in May. Not losing wt now. Has not been careful with diet. Not exercising much. No abdominal pain jaundice itching confusion hematemesis or melena. No thyroid issues. No liver disease or liver cancer in family. No family hx of any cancer including medullary cancer of thyroid or MEN

## 2024-03-14 NOTE — REVIEW OF SYSTEMS
[Fever] : no fever [Recent Weight Loss (___ Lbs)] : no recent weight loss [Chills] : no chills [Eye Pain] : no eye pain [Red Eyes] : eyes not red [Earache] : no earache [Chest Pain] : no chest pain [Loss Of Hearing] : no hearing loss [Cough] : no cough [Palpitations] : no palpitations [As Noted in HPI] : as noted in HPI [SOB on Exertion] : no shortness of breath during exertion [Joint Swelling] : no joint swelling [Skin Lesions] : no skin lesions [Confused] : no confusion [Convulsions] : no convulsions [Easy Bleeding] : no tendency for easy bleeding [Easy Bruising] : no tendency for easy bruising

## 2024-03-14 NOTE — REVIEW OF SYSTEMS
[Fever] : no fever [Chills] : no chills [Recent Weight Loss (___ Lbs)] : no recent weight loss [Red Eyes] : eyes not red [Eye Pain] : no eye pain [Earache] : no earache [Chest Pain] : no chest pain [Loss Of Hearing] : no hearing loss [Cough] : no cough [Palpitations] : no palpitations [As Noted in HPI] : as noted in HPI [SOB on Exertion] : no shortness of breath during exertion [Joint Swelling] : no joint swelling [Confused] : no confusion [Skin Lesions] : no skin lesions [Convulsions] : no convulsions [Easy Bleeding] : no tendency for easy bleeding [Easy Bruising] : no tendency for easy bruising

## 2024-03-14 NOTE — ASSESSMENT
[FreeTextEntry1] : 49 year-old  female with morbid obesity hepatic steatosis Tavarez's  hx of elevated transaminases seen in ffollow up.   Morbid obesity with NAFLD Has severe steatosis  S3 score and no fibrosis on VCTE April 2022 BMI 44 initially with wt 306 lb  BMI 36 Has tried diet and exercise for years with no success.  Started semaglutide therapy 0.25 mg /weekly in May 2022.   Counseled on MEN MTC and pancreatitis. Side effects nausea bloating flatulence. Increased dose to 0.5 mg weekly in July 2022 and 1 mg in Jan 2023 followed by 1.7 mg in May 2023. Tolerating well with no side effect.  Lost 26 lb total.  Had about 17 lb wt loss after hiatal hernia surgery. Wt now 267 lb. On semaglutide 2.4 mg weekly but no wt loss since Dec  2023.  Repeat Fibroscan CAP score 283 S0 steatosis with LS 5.7 F0-F1 Will switch to tirzepatide Advised diet and exercise Asking about phentermine topiramate combination. Advised bariatric clinic.   Elevated transaminases - normal now Reports admission for jaundice and severe elevation of transaminases in 2015 with ERCP complicated by perforation and pancreatitis. No jaundice after that.  Since then has mild elevation of transaminases and told had fatty liver.  AST ALT 40s in March 2022  Fibrosan showed CAP score 350 S2 LS 7.9 kPa F0 -1 US did not report steatosis. Patient is morbidly obese but does not have other components of metabolic syndrome. A1c 5.4 LDL TG normal May 2023 ASMA borderline. AMA Ig levels normal ceruloplasmin normal iron sat normal. Liver tests remain normal.   Hepatic cyst Benign appearance. 2 cm left hepatic cyst.  Health maintenance HCV negative Hepatitis A not immune. Advised vaccination.   Hep B - immune.  Colonoscopy done in Sept 2022  Follow up in 3 months.

## 2024-03-14 NOTE — PHYSICAL EXAM
[Scleral Icterus] : No Scleral Icterus [Spider Angioma] : No spider angioma(s) were observed [Abdominal  Ascites] : no ascites [Splenomegaly] : no splenomegaly [Liver Palpable ___ Finger Breadths Below Costal Margin] : was not palpable below costal margin [Asterixis] : no asterixis observed [Jaundice] : No jaundice [General Appearance - Alert] : alert [FreeTextEntry1] : Obese [Sclera] : the sclera and conjunctiva were normal [Outer Ear] : the ears and nose were normal in appearance [Neck Cervical Mass (___cm)] : no neck mass was observed [Thyroid Diffuse Enlargement] : the thyroid was not enlarged [Auscultation Breath Sounds / Voice Sounds] : lungs were clear to auscultation bilaterally [Heart Sounds] : normal S1 and S2 [Murmurs] : no murmurs [Edema] : there was no peripheral edema [Abdomen Soft] : soft [Abdomen Tenderness] : non-tender [Cervical Lymph Nodes Enlarged Posterior Bilaterally] : posterior cervical [Cervical Lymph Nodes Enlarged Anterior Bilaterally] : anterior cervical [Supraclavicular Lymph Nodes Enlarged Bilaterally] : supraclavicular [Nail Clubbing] : no clubbing  or cyanosis of the fingernails [Musculoskeletal - Swelling] : no joint swelling seen [] : no rash [No Focal Deficits] : no focal deficits [Oriented To Time, Place, And Person] : oriented to person, place, and time

## 2024-03-18 ENCOUNTER — APPOINTMENT (OUTPATIENT)
Dept: GASTROENTEROLOGY | Facility: CLINIC | Age: 49
End: 2024-03-18

## 2024-03-18 VITALS
SYSTOLIC BLOOD PRESSURE: 122 MMHG | WEIGHT: 267 LBS | BODY MASS INDEX: 37.38 KG/M2 | HEIGHT: 71 IN | DIASTOLIC BLOOD PRESSURE: 78 MMHG

## 2024-03-18 PROCEDURE — XXXXX: CPT | Mod: 1L

## 2024-03-19 RX ORDER — RIFAXIMIN 550 MG/1
550 TABLET ORAL
Qty: 42 | Refills: 0 | Status: ACTIVE | COMMUNITY
Start: 2024-03-18 | End: 1900-01-01

## 2024-04-03 ENCOUNTER — APPOINTMENT (OUTPATIENT)
Dept: SURGERY | Facility: CLINIC | Age: 49
End: 2024-04-03
Payer: COMMERCIAL

## 2024-04-03 VITALS
HEART RATE: 100 BPM | BODY MASS INDEX: 36.4 KG/M2 | OXYGEN SATURATION: 98 % | DIASTOLIC BLOOD PRESSURE: 76 MMHG | HEIGHT: 71 IN | TEMPERATURE: 97 F | SYSTOLIC BLOOD PRESSURE: 122 MMHG | WEIGHT: 260 LBS

## 2024-04-03 DIAGNOSIS — K64.5 PERIANAL VENOUS THROMBOSIS: ICD-10-CM

## 2024-04-03 PROCEDURE — 99203 OFFICE O/P NEW LOW 30 MIN: CPT

## 2024-04-03 RX ORDER — HYDROCORTISONE 25 MG/G
2.5 CREAM TOPICAL
Qty: 60 | Refills: 3 | Status: ACTIVE | COMMUNITY
Start: 2024-04-03 | End: 1900-01-01

## 2024-04-03 RX ORDER — IBUPROFEN 800 MG/1
800 TABLET, FILM COATED ORAL 3 TIMES DAILY
Qty: 60 | Refills: 2 | Status: ACTIVE | COMMUNITY
Start: 2024-04-03 | End: 1900-01-01

## 2024-04-23 NOTE — REASON FOR VISIT
[Initial Eval - Existing Diagnosis] : an initial evaluation of an existing diagnosis [FreeTextEntry1] : Thrombosed External Hemorrhoids

## 2024-04-23 NOTE — ADDENDUM
[FreeTextEntry1] : I, Dari Ortega ([facility]) assisted in filling out this chart under the dictation of Dr. Scott Garcia on 04/04/2024.

## 2024-04-23 NOTE — PHYSICAL EXAM
[FreeTextEntry1] : General: Awake, Alert, No Acute Distress Respiratory: Normal Respiratory Effort Rectal: Exam shows a 3-centimeter right posterior lateral thrombosed external hemorrhoid with a 1cm central ulceration.

## 2024-04-23 NOTE — HISTORY OF PRESENT ILLNESS
[FreeTextEntry1] : Patient is a 49-year-old female with a PMHx of asthma, GERD, Tavarez's esophagus, and Hiatal hernia, s/p hiatal hernia repair with Nissen fundoplication on September 2023. PSHx of hand and wrist surgery, who presents for evaluation of thrombosed external hemorrhoid. Patient reports that since her hiatal hernia repair, she has developed chronic diarrhea. She can have up to eight to 12 watery bowel movements daily. She currently is limiting her diet because she reports increasing diarrhea when she eats. She's currently being treated by Dr. Martinez Fletcher, who recently started on Xifaxan. This has not improved her symptoms. Five days ago, the patient developed perianal pain and swelling. She reports that this has happened once before with the birth of her child. She otherwise feels well. She's tolerating a diet. She denies recent fevers, chills, nausea, or vomiting. She denies a family history of colon cancer, rectal cancer, or inflammatory bowel disease. Her last colonoscopy was in May 2022 with Dr. Martinez Fletcher, which showed small hemorrhoids and a sessile serrated polyp.

## 2024-04-23 NOTE — ASSESSMENT
[FreeTextEntry1] : 49 year old female with thrombosed external hemorrhoid.   I spoke to the patient regarding her condition. Unfortunately, her diarrhea likely has led to her developing a thrombosed external hemorrhoid. I recommend that she return to as regular of a diet as she can, including fiber gummies, which she has been taking. Additionally, she will use Imodium as needed to improve her diarrhea. She will follow up with Dr. Martinez Fletcher for a colonoscopy and biopsy and treatment of her diarrhea. I also recommend a topical hydrocortisone cream and Sitz baths. We will see her back in one month.

## 2024-05-02 NOTE — ASU PREOP CHECKLIST - BP NONINVASIVE SYSTOLIC (MM HG)
Patient has tried multiple meds, she would like to try Ubrelvy, it does not appear to be covered by insurance. Refer to clinical pharmacy to review options   118

## 2024-05-08 ENCOUNTER — APPOINTMENT (OUTPATIENT)
Dept: SURGERY | Facility: CLINIC | Age: 49
End: 2024-05-08

## 2024-05-21 ENCOUNTER — OUTPATIENT (OUTPATIENT)
Dept: OUTPATIENT SERVICES | Facility: HOSPITAL | Age: 49
LOS: 1 days | End: 2024-05-21
Payer: COMMERCIAL

## 2024-05-21 ENCOUNTER — RESULT REVIEW (OUTPATIENT)
Age: 49
End: 2024-05-21

## 2024-05-21 DIAGNOSIS — Z12.31 ENCOUNTER FOR SCREENING MAMMOGRAM FOR MALIGNANT NEOPLASM OF BREAST: ICD-10-CM

## 2024-05-21 DIAGNOSIS — K25.1 ACUTE GASTRIC ULCER WITH PERFORATION: Chronic | ICD-10-CM

## 2024-05-21 PROCEDURE — 77063 BREAST TOMOSYNTHESIS BI: CPT | Mod: 26

## 2024-05-21 PROCEDURE — 77067 SCR MAMMO BI INCL CAD: CPT

## 2024-05-21 PROCEDURE — 77067 SCR MAMMO BI INCL CAD: CPT | Mod: 26

## 2024-05-21 PROCEDURE — 77063 BREAST TOMOSYNTHESIS BI: CPT

## 2024-05-22 DIAGNOSIS — Z12.31 ENCOUNTER FOR SCREENING MAMMOGRAM FOR MALIGNANT NEOPLASM OF BREAST: ICD-10-CM

## 2024-05-30 ENCOUNTER — APPOINTMENT (OUTPATIENT)
Dept: SURGERY | Facility: CLINIC | Age: 49
End: 2024-05-30
Payer: COMMERCIAL

## 2024-05-30 ENCOUNTER — TRANSCRIPTION ENCOUNTER (OUTPATIENT)
Age: 49
End: 2024-05-30

## 2024-05-30 VITALS
DIASTOLIC BLOOD PRESSURE: 78 MMHG | BODY MASS INDEX: 40.27 KG/M2 | OXYGEN SATURATION: 98 % | TEMPERATURE: 97 F | HEART RATE: 115 BPM | WEIGHT: 275 LBS | HEIGHT: 69.25 IN | SYSTOLIC BLOOD PRESSURE: 124 MMHG

## 2024-05-30 DIAGNOSIS — K21.9 GASTRO-ESOPHAGEAL REFLUX DISEASE W/OUT ESOPHAGITIS: ICD-10-CM

## 2024-05-30 DIAGNOSIS — K22.70 BARRETT'S ESOPHAGUS W/OUT DYSPLASIA: ICD-10-CM

## 2024-05-30 DIAGNOSIS — K58.0 IRRITABLE BOWEL SYNDROME WITH DIARRHEA: ICD-10-CM

## 2024-05-30 DIAGNOSIS — R79.89 OTHER SPECIFIED ABNORMAL FINDINGS OF BLOOD CHEMISTRY: ICD-10-CM

## 2024-05-30 DIAGNOSIS — E66.01 MORBID (SEVERE) OBESITY DUE TO EXCESS CALORIES: ICD-10-CM

## 2024-05-30 PROCEDURE — 99204 OFFICE O/P NEW MOD 45 MIN: CPT

## 2024-06-04 LAB
ALBUMIN SERPL ELPH-MCNC: 4.4 G/DL
ALP BLD-CCNC: 106 U/L
ALT SERPL-CCNC: 23 U/L
ANION GAP SERPL CALC-SCNC: 10 MMOL/L
AST SERPL-CCNC: 28 U/L
BILIRUB SERPL-MCNC: 1 MG/DL
BUN SERPL-MCNC: 19 MG/DL
CALCIUM SERPL-MCNC: 9.1 MG/DL
CHLORIDE SERPL-SCNC: 102 MMOL/L
CHOLEST SERPL-MCNC: 152 MG/DL
CO2 SERPL-SCNC: 26 MMOL/L
CREAT SERPL-MCNC: 0.9 MG/DL
EGFR: 78 ML/MIN/1.73M2
ESTIMATED AVERAGE GLUCOSE: 103 MG/DL
GLUCOSE SERPL-MCNC: 83 MG/DL
HBA1C MFR BLD HPLC: 5.2 %
HCT VFR BLD CALC: 39.9 %
HDLC SERPL-MCNC: 64 MG/DL
HGB BLD-MCNC: 13.3 G/DL
LDLC SERPL CALC-MCNC: 74 MG/DL
MCHC RBC-ENTMCNC: 30.3 PG
MCHC RBC-ENTMCNC: 33.3 G/DL
MCV RBC AUTO: 90.9 FL
NONHDLC SERPL-MCNC: 88 MG/DL
PLATELET # BLD AUTO: 241 K/UL
PMV BLD AUTO: 0 /100 WBCS
PMV BLD: 9.4 FL
POTASSIUM SERPL-SCNC: 4.6 MMOL/L
PROT SERPL-MCNC: 7.1 G/DL
RBC # BLD: 4.39 M/UL
RBC # FLD: 12.9 %
SODIUM SERPL-SCNC: 138 MMOL/L
TRIGL SERPL-MCNC: 69 MG/DL
WBC # FLD AUTO: 4.97 K/UL

## 2024-06-05 ENCOUNTER — TRANSCRIPTION ENCOUNTER (OUTPATIENT)
Age: 49
End: 2024-06-05

## 2024-06-12 ENCOUNTER — APPOINTMENT (OUTPATIENT)
Dept: GASTROENTEROLOGY | Facility: CLINIC | Age: 49
End: 2024-06-12

## 2024-06-12 RX ORDER — SEMAGLUTIDE 2.4 MG/.75ML
2.4 INJECTION, SOLUTION SUBCUTANEOUS
Qty: 12 | Refills: 3 | Status: ACTIVE | COMMUNITY
Start: 2023-08-09 | End: 1900-01-01

## 2024-06-20 RX ORDER — TIRZEPATIDE 10 MG/.5ML
10 INJECTION, SOLUTION SUBCUTANEOUS
Qty: 1 | Refills: 2 | Status: ACTIVE | COMMUNITY
Start: 2024-03-13 | End: 1900-01-01

## 2024-06-24 PROBLEM — K21.9 CHRONIC GERD: Status: ACTIVE | Noted: 2022-03-21

## 2024-06-24 PROBLEM — E66.01 MORBID OBESITY: Status: ACTIVE | Noted: 2022-03-21

## 2024-06-24 PROBLEM — K58.0 IRRITABLE BOWEL SYNDROME WITH DIARRHEA: Status: ACTIVE | Noted: 2024-03-18

## 2024-06-24 PROBLEM — K22.70 BARRETT'S ESOPHAGUS: Status: ACTIVE | Noted: 2020-03-04

## 2024-06-24 PROBLEM — R79.89 ABNORMAL LFTS: Status: ACTIVE | Noted: 2022-03-21

## 2024-07-03 ENCOUNTER — APPOINTMENT (OUTPATIENT)
Dept: GASTROENTEROLOGY | Facility: CLINIC | Age: 49
End: 2024-07-03

## 2024-07-03 DIAGNOSIS — K21.9 GASTRO-ESOPHAGEAL REFLUX DISEASE W/OUT ESOPHAGITIS: ICD-10-CM

## 2024-07-03 DIAGNOSIS — K58.0 IRRITABLE BOWEL SYNDROME WITH DIARRHEA: ICD-10-CM

## 2024-07-03 DIAGNOSIS — K22.70 BARRETT'S ESOPHAGUS W/OUT DYSPLASIA: ICD-10-CM

## 2024-07-03 DIAGNOSIS — E66.01 MORBID (SEVERE) OBESITY DUE TO EXCESS CALORIES: ICD-10-CM

## 2024-07-18 ENCOUNTER — TRANSCRIPTION ENCOUNTER (OUTPATIENT)
Age: 49
End: 2024-07-18

## 2024-07-29 ENCOUNTER — NON-APPOINTMENT (OUTPATIENT)
Age: 49
End: 2024-07-29

## 2024-07-30 ENCOUNTER — APPOINTMENT (OUTPATIENT)
Dept: SURGERY | Facility: CLINIC | Age: 49
End: 2024-07-30

## 2024-08-15 ENCOUNTER — APPOINTMENT (OUTPATIENT)
Dept: GASTROENTEROLOGY | Facility: CLINIC | Age: 49
End: 2024-08-15
Payer: COMMERCIAL

## 2024-08-15 VITALS
HEART RATE: 80 BPM | DIASTOLIC BLOOD PRESSURE: 83 MMHG | OXYGEN SATURATION: 99 % | WEIGHT: 270 LBS | HEIGHT: 69 IN | BODY MASS INDEX: 39.99 KG/M2 | SYSTOLIC BLOOD PRESSURE: 104 MMHG

## 2024-08-15 DIAGNOSIS — R74.8 ABNORMAL LEVELS OF OTHER SERUM ENZYMES: ICD-10-CM

## 2024-08-15 DIAGNOSIS — L53.9 ERYTHEMATOUS CONDITION, UNSPECIFIED: ICD-10-CM

## 2024-08-15 DIAGNOSIS — K76.0 FATTY (CHANGE OF) LIVER, NOT ELSEWHERE CLASSIFIED: ICD-10-CM

## 2024-08-15 DIAGNOSIS — L29.9 PRURITUS, UNSPECIFIED: ICD-10-CM

## 2024-08-15 DIAGNOSIS — E66.01 MORBID (SEVERE) OBESITY DUE TO EXCESS CALORIES: ICD-10-CM

## 2024-08-15 PROCEDURE — 99214 OFFICE O/P EST MOD 30 MIN: CPT

## 2024-08-15 RX ORDER — DIPHENHYDRAMINE HCL 25 MG/1
25 CAPSULE ORAL
Qty: 30 | Refills: 0 | Status: ACTIVE | COMMUNITY
Start: 2024-08-15 | End: 1900-01-01

## 2024-08-15 RX ORDER — CETIRIZINE HYDROCHLORIDE 5 MG/1
5 TABLET, CHEWABLE ORAL DAILY
Qty: 30 | Refills: 0 | Status: ACTIVE | COMMUNITY
Start: 2024-08-15 | End: 1900-01-01

## 2024-08-16 ENCOUNTER — TRANSCRIPTION ENCOUNTER (OUTPATIENT)
Age: 49
End: 2024-08-16

## 2024-08-16 NOTE — HISTORY OF PRESENT ILLNESS
[___ Month(s) Ago] : [unfilled] month(s) ago [___] : Performed [unfilled] [de-identified] : 3/13/24 [de-identified] : Patient reports that in Dec 2015 she was extremely sick with elevated liver enzymes. She had severe fatigue and jaundice. Tests revealed AST ALT in 900s and she had ERCP complicated by perforation and pancreatitis. Jaundice resolved after a prolonged hospital stay at [facility]. She has monitoring of liver tests every 6 months and AST ALT are in the 40s. She was 250 lb for many years prior and has been 275 in the past year. No Hx of HTN HLD DM. No hx of supplement use, herbal teas or antibiotics. Was on OC pills in her teens. Patient also states that she has a cyst on her liver. Had robot assisted hiatal hernia repair and Nissen fundoplication September 11 2023. Had difficulty post surgery. Was off semaglutide and resumed in Oct 2023. Had lost wt from 308 lb to 280 lb. Lost 13 lb post surgery down to 267 lb. has been on 2.4 mg weekly but has not losing any wt since Dec 2023. Has diarrhea since hiatal hernia surgery. No abdominal pain. Diet not always careful. Not been exercising.  Did not have further wt loss with semaglutide so switched to Zepbound in March 2024.  Developed injection site erythema and itching after injection of Zepbound.  R arm was red itching and about size of a coin. The following week had erythema and redness at injection site on left arm. It was larger than palm yesterday. Put Neosporin cream and was helping itching. Put Ice pack after speaking to me and that helped as well. Today reduced in size. On Zepbound 12.5 mg   Has chronic regurgitation and reports having hiatal hernia surgery in September. Had lost 26 lb in total but none since Oct 2022.  was 290 lb in Oct 2022. Had gained wt to 275 lb now down to 270 lb. Has not been careful with diet. Not exercising much. No abdominal pain jaundice itching confusion hematemesis or melena. No thyroid issues. No liver disease or liver cancer in family. No family hx of any cancer including medullary cancer of thyroid or MEN

## 2024-08-16 NOTE — REVIEW OF SYSTEMS
[As Noted in HPI] : as noted in HPI [Fever] : no fever [Chills] : no chills [Eye Pain] : no eye pain [Red Eyes] : eyes not red [Earache] : no earache [Loss Of Hearing] : no hearing loss [Chest Pain] : no chest pain [Palpitations] : no palpitations [Cough] : no cough [SOB on Exertion] : no shortness of breath during exertion [Joint Swelling] : no joint swelling [Easy Bleeding] : no tendency for easy bleeding [Easy Bruising] : no tendency for easy bruising

## 2024-08-16 NOTE — HISTORY OF PRESENT ILLNESS
[___ Month(s) Ago] : [unfilled] month(s) ago [___] : Performed [unfilled] [de-identified] : 3/13/24 [de-identified] : Patient reports that in Dec 2015 she was extremely sick with elevated liver enzymes. She had severe fatigue and jaundice. Tests revealed AST ALT in 900s and she had ERCP complicated by perforation and pancreatitis. Jaundice resolved after a prolonged hospital stay at [facility]. She has monitoring of liver tests every 6 months and AST ALT are in the 40s. She was 250 lb for many years prior and has been 275 in the past year. No Hx of HTN HLD DM. No hx of supplement use, herbal teas or antibiotics. Was on OC pills in her teens. Patient also states that she has a cyst on her liver. Had robot assisted hiatal hernia repair and Nissen fundoplication September 11 2023. Had difficulty post surgery. Was off semaglutide and resumed in Oct 2023. Had lost wt from 308 lb to 280 lb. Lost 13 lb post surgery down to 267 lb. has been on 2.4 mg weekly but has not losing any wt since Dec 2023. Has diarrhea since hiatal hernia surgery. No abdominal pain. Diet not always careful. Not been exercising.  Did not have further wt loss with semaglutide so switched to Zepbound in March 2024.  Developed injection site erythema and itching after injection of Zepbound.  R arm was red itching and about size of a coin. The following week had erythema and redness at injection site on left arm. It was larger than palm yesterday. Put Neosporin cream and was helping itching. Put Ice pack after speaking to me and that helped as well. Today reduced in size. On Zepbound 12.5 mg   Has chronic regurgitation and reports having hiatal hernia surgery in September. Had lost 26 lb in total but none since Oct 2022.  was 290 lb in Oct 2022. Had gained wt to 275 lb now down to 270 lb. Has not been careful with diet. Not exercising much. No abdominal pain jaundice itching confusion hematemesis or melena. No thyroid issues. No liver disease or liver cancer in family. No family hx of any cancer including medullary cancer of thyroid or MEN

## 2024-08-16 NOTE — PHYSICAL EXAM
[General Appearance - Alert] : alert [Sclera] : the sclera and conjunctiva were normal [Outer Ear] : the ears and nose were normal in appearance [Neck Cervical Mass (___cm)] : no neck mass was observed [Thyroid Diffuse Enlargement] : the thyroid was not enlarged [Heart Sounds] : normal S1 and S2 [Murmurs] : no murmurs [Edema] : there was no peripheral edema [Abdomen Soft] : soft [Abdomen Tenderness] : non-tender [] : no hepato-splenomegaly [Cervical Lymph Nodes Enlarged Posterior Bilaterally] : posterior cervical [Cervical Lymph Nodes Enlarged Anterior Bilaterally] : anterior cervical [Supraclavicular Lymph Nodes Enlarged Bilaterally] : supraclavicular [Nail Clubbing] : no clubbing  or cyanosis of the fingernails [Musculoskeletal - Swelling] : no joint swelling seen [Oriented To Time, Place, And Person] : oriented to person, place, and time [Scleral Icterus] : No Scleral Icterus [Spider Angioma] : No spider angioma(s) were observed [Abdominal  Ascites] : no ascites [Splenomegaly] : no splenomegaly [Liver Palpable ___ Finger Breadths Below Costal Margin] : was not palpable below costal margin [Asterixis] : no asterixis observed [Jaundice] : No jaundice [FreeTextEntry1] : small area of erythema R arm and  L arm. Non tender not raised no increase in temperature

## 2024-08-16 NOTE — ASSESSMENT
[FreeTextEntry1] : 49 year-old  female with morbid obesity hepatic steatosis Tavarez's hx of elevated transaminases on tirzepatide seen in follow up.  Injection site erythema For past 2 doses has been having injection site erythema and itching at site of Zepbound injection Advised to ensure alcohol wipe is not  Take antihistamine - first try non-sedating cetrizine  an hour before injection and as needed daily for ithcing If cetrizine does not work can try Benadryl but to take at night time Can try OTC benadryl cream if available  If severe reaction should stop  Morbid obesity with NAFLD Has severe steatosis  S3 score and no fibrosis on VCTE 2022 BMI 44 initially with wt 306 lb BMI 36 Has tried diet and exercise for years with no success. Started semaglutide therapy 0.25 mg /weekly in May 2022.  Counseled on MEN MTC and pancreatitis. Side effects nausea bloating flatulence. Increased dose to 0.5 mg weekly in 2022 and 1 mg in 2023 followed by 1.7 mg in May 2023. Tolerating well with no side effect. Lost 26 lb total. Had about 17 lb wt loss after hiatal hernia surgery. Wt was 267 lb post surgery.  Was on semaglutide 2.4 mg weekly but no wt loss since Dec 2023. Switched to tirzeptide in Mar 2024 10 mg.  Repeat Fibroscan CAP score 283 S0 steatosis with LS 5.7 F0-F1 Increased tirzepatide dose to 12.5 mg weekly on 2024 Advised diet and exercise Asking about phentermine topiramate combination. Advised bariatric clinic.  Elevated transaminases - normal now Reports admission for jaundice and severe elevation of transaminases in  with ERCP complicated by perforation and pancreatitis. No jaundice after that. Since then has mild elevation of transaminases and told had fatty liver. AST ALT 40s in 2022  Fibrosan showed CAP score 350 S2 LS 7.9 kPa F0 -1 US did not report steatosis. Patient is morbidly obese but does not have other components of metabolic syndrome. A1c 5.4 LDL TG normal May 2023 ASMA borderline. AMA Ig levels normal ceruloplasmin normal iron sat normal. Liver tests remain normal.  Hepatic cyst Benign appearance. 2 cm left hepatic cyst.  Health maintenance HCV negative Hepatitis A not immune. Advised vaccination.  Hep B - immune. Colonoscopy done in 2022  Follow up in 3 months.

## 2024-09-09 ENCOUNTER — NON-APPOINTMENT (OUTPATIENT)
Age: 49
End: 2024-09-09

## 2024-09-10 ENCOUNTER — LABORATORY RESULT (OUTPATIENT)
Age: 49
End: 2024-09-10

## 2024-09-10 ENCOUNTER — APPOINTMENT (OUTPATIENT)
Dept: UROLOGY | Facility: CLINIC | Age: 49
End: 2024-09-10
Payer: COMMERCIAL

## 2024-09-10 VITALS — HEIGHT: 71 IN | WEIGHT: 270 LBS | BODY MASS INDEX: 37.8 KG/M2

## 2024-09-10 DIAGNOSIS — R31.21 ASYMPTOMATIC MICROSCOPIC HEMATURIA: ICD-10-CM

## 2024-09-10 DIAGNOSIS — Z87.891 PERSONAL HISTORY OF NICOTINE DEPENDENCE: ICD-10-CM

## 2024-09-10 DIAGNOSIS — R39.9 UNSPECIFIED SYMPTOMS AND SIGNS INVOLVING THE GENITOURINARY SYSTEM: ICD-10-CM

## 2024-09-10 DIAGNOSIS — R39.15 URGENCY OF URINATION: ICD-10-CM

## 2024-09-10 LAB
BILIRUB UR QL STRIP: NORMAL
COLLECTION METHOD: NORMAL
GLUCOSE UR-MCNC: NORMAL
HCG UR QL: 0.2 EU/DL
HGB UR QL STRIP.AUTO: NORMAL
KETONES UR-MCNC: NORMAL
LEUKOCYTE ESTERASE UR QL STRIP: NORMAL
NITRITE UR QL STRIP: NORMAL
PH UR STRIP: 5.5
PROT UR STRIP-MCNC: NORMAL
SP GR UR STRIP: 1.03

## 2024-09-10 PROCEDURE — 81003 URINALYSIS AUTO W/O SCOPE: CPT | Mod: QW

## 2024-09-10 PROCEDURE — 99213 OFFICE O/P EST LOW 20 MIN: CPT

## 2024-09-10 NOTE — PHYSICAL EXAM
[General Appearance - Well Developed] : well developed [Normal Appearance] : normal appearance [Well Groomed] : well groomed [General Appearance - In No Acute Distress] : no acute distress [Abdomen Soft] : soft [Abdomen Tenderness] : non-tender [Costovertebral Angle Tenderness] : no ~M costovertebral angle tenderness [Edema] : no peripheral edema [] : no respiratory distress [Respiration, Rhythm And Depth] : normal respiratory rhythm and effort [Exaggerated Use Of Accessory Muscles For Inspiration] : no accessory muscle use [Oriented To Time, Place, And Person] : oriented to person, place, and time [Affect] : the affect was normal [Mood] : the mood was normal [Not Anxious] : not anxious [Normal Station and Gait] : the gait and station were normal for the patient's age [No Focal Deficits] : no focal deficits [No Palpable Adenopathy] : no palpable adenopathy

## 2024-09-12 LAB — BACTERIA UR CULT: NORMAL

## 2024-09-17 ENCOUNTER — NON-APPOINTMENT (OUTPATIENT)
Age: 49
End: 2024-09-17

## 2024-09-23 ENCOUNTER — RESULT REVIEW (OUTPATIENT)
Age: 49
End: 2024-09-23

## 2024-09-23 ENCOUNTER — OUTPATIENT (OUTPATIENT)
Dept: OUTPATIENT SERVICES | Facility: HOSPITAL | Age: 49
LOS: 1 days | End: 2024-09-23
Payer: COMMERCIAL

## 2024-09-23 DIAGNOSIS — R39.9 UNSPECIFIED SYMPTOMS AND SIGNS INVOLVING THE GENITOURINARY SYSTEM: ICD-10-CM

## 2024-09-23 DIAGNOSIS — Z00.8 ENCOUNTER FOR OTHER GENERAL EXAMINATION: ICD-10-CM

## 2024-09-23 DIAGNOSIS — K25.1 ACUTE GASTRIC ULCER WITH PERFORATION: Chronic | ICD-10-CM

## 2024-09-23 PROCEDURE — 76770 US EXAM ABDO BACK WALL COMP: CPT | Mod: 26

## 2024-09-23 PROCEDURE — 76770 US EXAM ABDO BACK WALL COMP: CPT

## 2024-10-07 ENCOUNTER — APPOINTMENT (OUTPATIENT)
Dept: GASTROENTEROLOGY | Facility: CLINIC | Age: 49
End: 2024-10-07
Payer: COMMERCIAL

## 2024-10-07 VITALS — HEIGHT: 71 IN | WEIGHT: 275 LBS | BODY MASS INDEX: 38.5 KG/M2

## 2024-10-07 DIAGNOSIS — E66.01 MORBID (SEVERE) OBESITY DUE TO EXCESS CALORIES: ICD-10-CM

## 2024-10-07 PROCEDURE — G2211 COMPLEX E/M VISIT ADD ON: CPT

## 2024-10-07 PROCEDURE — 99214 OFFICE O/P EST MOD 30 MIN: CPT

## 2024-10-14 ENCOUNTER — OUTPATIENT (OUTPATIENT)
Dept: OUTPATIENT SERVICES | Facility: HOSPITAL | Age: 49
LOS: 1 days | End: 2024-10-14
Payer: COMMERCIAL

## 2024-10-14 VITALS
RESPIRATION RATE: 18 BRPM | OXYGEN SATURATION: 97 % | DIASTOLIC BLOOD PRESSURE: 89 MMHG | HEART RATE: 84 BPM | TEMPERATURE: 98 F | SYSTOLIC BLOOD PRESSURE: 136 MMHG | HEIGHT: 71 IN | WEIGHT: 270.95 LBS

## 2024-10-14 DIAGNOSIS — Z98.890 OTHER SPECIFIED POSTPROCEDURAL STATES: Chronic | ICD-10-CM

## 2024-10-14 DIAGNOSIS — K25.1 ACUTE GASTRIC ULCER WITH PERFORATION: Chronic | ICD-10-CM

## 2024-10-14 DIAGNOSIS — R31.21 ASYMPTOMATIC MICROSCOPIC HEMATURIA: ICD-10-CM

## 2024-10-14 DIAGNOSIS — Z01.818 ENCOUNTER FOR OTHER PREPROCEDURAL EXAMINATION: ICD-10-CM

## 2024-10-14 LAB
ALBUMIN SERPL ELPH-MCNC: 4.4 G/DL — SIGNIFICANT CHANGE UP (ref 3.5–5.2)
ALP SERPL-CCNC: 124 U/L — HIGH (ref 30–115)
ALT FLD-CCNC: 13 U/L — SIGNIFICANT CHANGE UP (ref 0–41)
ANION GAP SERPL CALC-SCNC: 10 MMOL/L — SIGNIFICANT CHANGE UP (ref 7–14)
APPEARANCE UR: CLEAR — SIGNIFICANT CHANGE UP
APTT BLD: 30 SEC — SIGNIFICANT CHANGE UP (ref 27–39.2)
AST SERPL-CCNC: 17 U/L — SIGNIFICANT CHANGE UP (ref 0–41)
BACTERIA # UR AUTO: NEGATIVE /HPF — SIGNIFICANT CHANGE UP
BASOPHILS # BLD AUTO: 0.06 K/UL — SIGNIFICANT CHANGE UP (ref 0–0.2)
BASOPHILS NFR BLD AUTO: 0.9 % — SIGNIFICANT CHANGE UP (ref 0–1)
BILIRUB SERPL-MCNC: 0.5 MG/DL — SIGNIFICANT CHANGE UP (ref 0.2–1.2)
BILIRUB UR-MCNC: NEGATIVE — SIGNIFICANT CHANGE UP
BUN SERPL-MCNC: 18 MG/DL — SIGNIFICANT CHANGE UP (ref 10–20)
CALCIUM SERPL-MCNC: 9 MG/DL — SIGNIFICANT CHANGE UP (ref 8.4–10.5)
CAST: 4 /LPF — SIGNIFICANT CHANGE UP (ref 0–4)
CHLORIDE SERPL-SCNC: 108 MMOL/L — SIGNIFICANT CHANGE UP (ref 98–110)
CO2 SERPL-SCNC: 23 MMOL/L — SIGNIFICANT CHANGE UP (ref 17–32)
COLOR SPEC: YELLOW — SIGNIFICANT CHANGE UP
CREAT SERPL-MCNC: 0.8 MG/DL — SIGNIFICANT CHANGE UP (ref 0.7–1.5)
DIFF PNL FLD: ABNORMAL
EGFR: 90 ML/MIN/1.73M2 — SIGNIFICANT CHANGE UP
EOSINOPHIL # BLD AUTO: 0.09 K/UL — SIGNIFICANT CHANGE UP (ref 0–0.7)
EOSINOPHIL NFR BLD AUTO: 1.3 % — SIGNIFICANT CHANGE UP (ref 0–8)
GLUCOSE SERPL-MCNC: 104 MG/DL — HIGH (ref 70–99)
GLUCOSE UR QL: NEGATIVE MG/DL — SIGNIFICANT CHANGE UP
HCT VFR BLD CALC: 41.1 % — SIGNIFICANT CHANGE UP (ref 37–47)
HGB BLD-MCNC: 13.6 G/DL — SIGNIFICANT CHANGE UP (ref 12–16)
IMM GRANULOCYTES NFR BLD AUTO: 0.4 % — HIGH (ref 0.1–0.3)
INR BLD: 0.94 RATIO — SIGNIFICANT CHANGE UP (ref 0.65–1.3)
KETONES UR-MCNC: ABNORMAL MG/DL
LEUKOCYTE ESTERASE UR-ACNC: NEGATIVE — SIGNIFICANT CHANGE UP
LYMPHOCYTES # BLD AUTO: 3.22 K/UL — SIGNIFICANT CHANGE UP (ref 1.2–3.4)
LYMPHOCYTES # BLD AUTO: 47.7 % — SIGNIFICANT CHANGE UP (ref 20.5–51.1)
MCHC RBC-ENTMCNC: 29.2 PG — SIGNIFICANT CHANGE UP (ref 27–31)
MCHC RBC-ENTMCNC: 33.1 G/DL — SIGNIFICANT CHANGE UP (ref 32–37)
MCV RBC AUTO: 88.2 FL — SIGNIFICANT CHANGE UP (ref 81–99)
MONOCYTES # BLD AUTO: 0.6 K/UL — SIGNIFICANT CHANGE UP (ref 0.1–0.6)
MONOCYTES NFR BLD AUTO: 8.9 % — SIGNIFICANT CHANGE UP (ref 1.7–9.3)
NEUTROPHILS # BLD AUTO: 2.75 K/UL — SIGNIFICANT CHANGE UP (ref 1.4–6.5)
NEUTROPHILS NFR BLD AUTO: 40.8 % — LOW (ref 42.2–75.2)
NITRITE UR-MCNC: NEGATIVE — SIGNIFICANT CHANGE UP
NRBC # BLD: 0 /100 WBCS — SIGNIFICANT CHANGE UP (ref 0–0)
PH UR: 5.5 — SIGNIFICANT CHANGE UP (ref 5–8)
PLATELET # BLD AUTO: 269 K/UL — SIGNIFICANT CHANGE UP (ref 130–400)
PMV BLD: 9.8 FL — SIGNIFICANT CHANGE UP (ref 7.4–10.4)
POTASSIUM SERPL-MCNC: 4.1 MMOL/L — SIGNIFICANT CHANGE UP (ref 3.5–5)
POTASSIUM SERPL-SCNC: 4.1 MMOL/L — SIGNIFICANT CHANGE UP (ref 3.5–5)
PROT SERPL-MCNC: 7.3 G/DL — SIGNIFICANT CHANGE UP (ref 6–8)
PROT UR-MCNC: NEGATIVE MG/DL — SIGNIFICANT CHANGE UP
PROTHROM AB SERPL-ACNC: 10.7 SEC — SIGNIFICANT CHANGE UP (ref 9.95–12.87)
RBC # BLD: 4.66 M/UL — SIGNIFICANT CHANGE UP (ref 4.2–5.4)
RBC # FLD: 13 % — SIGNIFICANT CHANGE UP (ref 11.5–14.5)
RBC CASTS # UR COMP ASSIST: 11 /HPF — HIGH (ref 0–4)
SODIUM SERPL-SCNC: 141 MMOL/L — SIGNIFICANT CHANGE UP (ref 135–146)
SP GR SPEC: 1.03 — SIGNIFICANT CHANGE UP (ref 1–1.03)
SQUAMOUS # UR AUTO: 2 /HPF — SIGNIFICANT CHANGE UP (ref 0–5)
UROBILINOGEN FLD QL: 0.2 MG/DL — SIGNIFICANT CHANGE UP (ref 0.2–1)
WBC # BLD: 6.75 K/UL — SIGNIFICANT CHANGE UP (ref 4.8–10.8)
WBC # FLD AUTO: 6.75 K/UL — SIGNIFICANT CHANGE UP (ref 4.8–10.8)
WBC UR QL: 1 /HPF — SIGNIFICANT CHANGE UP (ref 0–5)

## 2024-10-14 PROCEDURE — 99214 OFFICE O/P EST MOD 30 MIN: CPT | Mod: 25

## 2024-10-14 PROCEDURE — 87086 URINE CULTURE/COLONY COUNT: CPT

## 2024-10-14 PROCEDURE — 80053 COMPREHEN METABOLIC PANEL: CPT

## 2024-10-14 PROCEDURE — 85025 COMPLETE CBC W/AUTO DIFF WBC: CPT

## 2024-10-14 PROCEDURE — 85610 PROTHROMBIN TIME: CPT

## 2024-10-14 PROCEDURE — 81001 URINALYSIS AUTO W/SCOPE: CPT

## 2024-10-14 PROCEDURE — 36415 COLL VENOUS BLD VENIPUNCTURE: CPT

## 2024-10-14 PROCEDURE — 85730 THROMBOPLASTIN TIME PARTIAL: CPT

## 2024-10-14 NOTE — H&P PST ADULT - NSICDXPASTSURGICALHX_GEN_ALL_CORE_FT
PAST SURGICAL HISTORY:  Acute gastric perforation AFTER ERCP    H/O hand surgery     History of repair of hiatal hernia

## 2024-10-14 NOTE — H&P PST ADULT - PATIENT'S PREFERRED PRONOUN
December 1, 2017        LUDA Raygoza  [address]  [id number]  [facility] [address]  Phone: [phone]  Fax: [phone]             [facility]  [address]  [place] [address]  Phone: [phone]   Patient: Apurva Rodriguez   MR Number: [id number]   YOB: 1964   Date of Visit: 11/29/2017       Dear Dr. LUDA Raygoza    Thank you for referring Apurva Rodriguez to me for evaluation. Attached you will find relevant portions of my assessment and plan of care.    If you have questions, please do not hesitate to call me. I look forward to following Apurva Rodriguez along with you.    Sincerely,    Carlos Foley MD    Enclosure    If you would like to receive this communication electronically, please contact [email or web address] or [phone] to request [employer] access.    [employer] is a tool which provides read-only access to select patient information with whom you have a relationship. Its easy to use and provides real time access to review your patients record including encounter summaries, notes, results, and demographic information.    If you feel you have received this communication in error or would no longer like to receive these types of communications, please e-mail [email or web address]       
Her/She

## 2024-10-14 NOTE — H&P PST ADULT - HISTORY OF PRESENT ILLNESS
PATIENT CURRENTLY DENIES CHEST PAIN  SHORTNESS OF BREATH  PALPITATIONS,  DYSURIA, OR UPPER RESPIRATORY INFECTION IN PAST 2 WEEKS      Denies travel outside the [place] in the past 30 days  Patient denies any signs or symptoms of COVID 19 and denies contact with known positive individuals.         Anesthesia Alert  YES--Difficult Airway CLASS IV  NO--History of neck surgery or radiation  NO--Limited ROM of neck  NO--History of Malignant hyperthermia  NO--No personal or family history of Pseudocholinesterase deficiency.  YES--Prior Anesthesia Complication PT REPORTS WHEN SHE WAKES UP FROM ANESTHESIA SHE HAS RESPIRATORY DISTRESS; WHEEZING AND SHORTNESS OF BREATH REQUIRING DUO NEBS AND SUPPLEMENTAL OXYGEN  NO--Latex Allergy  NO--Loose teeth  NO--History of Rheumatoid Arthritis  NO--Bleeding risk  NO--BRITTNI  NO--Other_____    DASI 9.89    RCRI 0    PT DENIES ANY RASHES, ABRASION, OR OPEN WOUNDS OR CUTS    AS PER THE PT, THIS IS HIS/HER COMPLETE MEDICAL AND SURGICAL HX, INCLUDING MEDICATIONS PRESCRIBED AND OVER THE COUNTER    Patient/Guardian understands the instructions and was given the opportunity to ask questions and have them answered.    pt denies any suicidal ideation or thoughts, pt states not a threat to self or others

## 2024-10-14 NOTE — H&P PST ADULT - REASON FOR ADMISSION
48 Y/O F WITH PMHX ASTHMA (LAST EXACERBATION 9/2023) SCHEDULED FOR PAST FOR CYSTOSCOPY BLADDER BIOPSY WITH FULGURATION UNDER GA WITH DR MENJIVAR ON 10/28/24.

## 2024-10-14 NOTE — H&P PST ADULT - NSSUBSTANCEUSE_GEN_ALL_CORE_SD
Call the office for problems or questions.   May remove bandaids to ear and arm tomorrow and start showers. Leave tape strips/glue to back incision in place. They will dry up and fall off on their own in 1-2 weeks.    Want to Say “Thank You” to a Nurse?  The JOYA Award® was created in memory of KEIRY Hernández by his family to say thank you to nurses who provide an outstanding level of care.    Submit a nomination using any method below.     OR    [email or web address]  Or visit the Resource section   on your [employer] josue      
never used

## 2024-10-14 NOTE — H&P PST ADULT - BMI (KG/M2)
Patient was not evaluated in the clinic, swab was collected due to pre-op testing requirement to screen for COVID-19 using BD Max testing. 37.8

## 2024-10-14 NOTE — H&P PST ADULT - ANESTHESIA, PREVIOUS REACTION, PROFILE
PT REPORTS WHEN SHE WAKES UP FROM ANESTHESIA SHE HAS RESPIRATORY DISTRESS; WHEEZING AND SHORTNESS OF BREATH REQUIRING DUO NEBS AND SUPPLEMENTAL OXYGEN/respiratory complications

## 2024-10-15 DIAGNOSIS — R31.21 ASYMPTOMATIC MICROSCOPIC HEMATURIA: ICD-10-CM

## 2024-10-15 DIAGNOSIS — Z01.818 ENCOUNTER FOR OTHER PREPROCEDURAL EXAMINATION: ICD-10-CM

## 2024-10-16 LAB
CULTURE RESULTS: SIGNIFICANT CHANGE UP
SPECIMEN SOURCE: SIGNIFICANT CHANGE UP

## 2024-10-28 ENCOUNTER — RESULT REVIEW (OUTPATIENT)
Age: 49
End: 2024-10-28

## 2024-10-28 ENCOUNTER — TRANSCRIPTION ENCOUNTER (OUTPATIENT)
Age: 49
End: 2024-10-28

## 2024-10-28 ENCOUNTER — OUTPATIENT (OUTPATIENT)
Dept: OUTPATIENT SERVICES | Facility: HOSPITAL | Age: 49
LOS: 1 days | Discharge: HOME | End: 2024-10-28
Payer: COMMERCIAL

## 2024-10-28 ENCOUNTER — NON-APPOINTMENT (OUTPATIENT)
Age: 49
End: 2024-10-28

## 2024-10-28 ENCOUNTER — APPOINTMENT (OUTPATIENT)
Dept: UROLOGY | Facility: HOSPITAL | Age: 49
End: 2024-10-28

## 2024-10-28 VITALS
SYSTOLIC BLOOD PRESSURE: 123 MMHG | DIASTOLIC BLOOD PRESSURE: 88 MMHG | RESPIRATION RATE: 17 BRPM | OXYGEN SATURATION: 98 % | HEART RATE: 94 BPM | WEIGHT: 255.07 LBS | HEIGHT: 71 IN | TEMPERATURE: 98 F

## 2024-10-28 VITALS — RESPIRATION RATE: 18 BRPM | HEART RATE: 78 BPM | DIASTOLIC BLOOD PRESSURE: 60 MMHG | SYSTOLIC BLOOD PRESSURE: 117 MMHG

## 2024-10-28 DIAGNOSIS — Z98.890 OTHER SPECIFIED POSTPROCEDURAL STATES: Chronic | ICD-10-CM

## 2024-10-28 DIAGNOSIS — K25.1 ACUTE GASTRIC ULCER WITH PERFORATION: Chronic | ICD-10-CM

## 2024-10-28 DIAGNOSIS — R31.21 ASYMPTOMATIC MICROSCOPIC HEMATURIA: ICD-10-CM

## 2024-10-28 PROCEDURE — 88305 TISSUE EXAM BY PATHOLOGIST: CPT | Mod: 26

## 2024-10-28 PROCEDURE — 52204 CYSTOSCOPY W/BIOPSY(S): CPT

## 2024-10-28 PROCEDURE — 88305 TISSUE EXAM BY PATHOLOGIST: CPT

## 2024-10-28 RX ORDER — SULFAMETHOXAZOLE/TRIMETHOPRIM 800-160 MG
1 TABLET ORAL
Qty: 6 | Refills: 0
Start: 2024-10-28 | End: 2024-10-30

## 2024-10-28 RX ORDER — PHENAZOPYRIDINE HCL 95 MG
2 TABLET ORAL
Qty: 30 | Refills: 1
Start: 2024-10-28 | End: 2024-11-06

## 2024-10-28 RX ORDER — HYDROMORPHONE HCL/0.9% NACL/PF 6 MG/30 ML
0.5 PATIENT CONTROLLED ANALGESIA SYRINGE INTRAVENOUS
Refills: 0 | Status: DISCONTINUED | OUTPATIENT
Start: 2024-10-28 | End: 2024-10-28

## 2024-10-28 RX ORDER — ONDANSETRON HYDROCHLORIDE 2 MG/ML
4 INJECTION, SOLUTION INTRAMUSCULAR; INTRAVENOUS ONCE
Refills: 0 | Status: DISCONTINUED | OUTPATIENT
Start: 2024-10-28 | End: 2024-10-28

## 2024-10-28 RX ORDER — KETOROLAC TROMETHAMINE 30 MG/ML
30 INJECTION INTRAMUSCULAR; INTRAVENOUS ONCE
Refills: 0 | Status: DISCONTINUED | OUTPATIENT
Start: 2024-10-28 | End: 2024-10-28

## 2024-10-28 RX ORDER — ALBUTEROL 90 MCG
2 AEROSOL (GRAM) INHALATION
Refills: 0 | DISCHARGE

## 2024-10-28 RX ORDER — PHENAZOPYRIDINE HCL 95 MG
200 TABLET ORAL ONCE
Refills: 0 | Status: COMPLETED | OUTPATIENT
Start: 2024-10-28 | End: 2024-10-28

## 2024-10-28 RX ADMIN — Medication 200 MILLIGRAM(S): at 18:03

## 2024-10-28 RX ADMIN — KETOROLAC TROMETHAMINE 30 MILLIGRAM(S): 30 INJECTION INTRAMUSCULAR; INTRAVENOUS at 18:03

## 2024-10-28 RX ADMIN — KETOROLAC TROMETHAMINE 30 MILLIGRAM(S): 30 INJECTION INTRAMUSCULAR; INTRAVENOUS at 18:27

## 2024-10-28 NOTE — ASU DISCHARGE PLAN (ADULT/PEDIATRIC) - ASU DC SPECIAL INSTRUCTIONSFT
expect some blood in urine  expect some pain w/ voiding   call now for appt in 3 weeks   resume any other meds

## 2024-10-28 NOTE — BRIEF OPERATIVE NOTE - NSICDXBRIEFPROCEDURE_GEN_ALL_CORE_FT
PROCEDURES:  Cystoscopy, with biopsy and fulguration of bladder 28-Oct-2024 17:51:55  Yomi MENJIVAR

## 2024-10-28 NOTE — ASU DISCHARGE PLAN (ADULT/PEDIATRIC) - FINANCIAL ASSISTANCE
[facility] provides services at a reduced cost to those who are determined to be eligible through [facility]’s financial assistance program. Information regarding [facility]’s financial assistance program can be found by going to [email or web address] or by calling [phone].

## 2024-10-29 ENCOUNTER — NON-APPOINTMENT (OUTPATIENT)
Age: 49
End: 2024-10-29

## 2024-10-30 LAB — SURGICAL PATHOLOGY STUDY: SIGNIFICANT CHANGE UP

## 2024-11-01 ENCOUNTER — NON-APPOINTMENT (OUTPATIENT)
Age: 49
End: 2024-11-01

## 2024-11-04 DIAGNOSIS — Z88.0 ALLERGY STATUS TO PENICILLIN: ICD-10-CM

## 2024-11-04 DIAGNOSIS — Z87.891 PERSONAL HISTORY OF NICOTINE DEPENDENCE: ICD-10-CM

## 2024-11-04 DIAGNOSIS — R31.21 ASYMPTOMATIC MICROSCOPIC HEMATURIA: ICD-10-CM

## 2024-11-04 DIAGNOSIS — E66.9 OBESITY, UNSPECIFIED: ICD-10-CM

## 2024-11-04 DIAGNOSIS — N30.80 OTHER CYSTITIS WITHOUT HEMATURIA: ICD-10-CM

## 2024-11-04 DIAGNOSIS — J45.909 UNSPECIFIED ASTHMA, UNCOMPLICATED: ICD-10-CM

## 2024-11-19 ENCOUNTER — APPOINTMENT (OUTPATIENT)
Dept: UROLOGY | Facility: CLINIC | Age: 49
End: 2024-11-19
Payer: COMMERCIAL

## 2024-11-19 VITALS
RESPIRATION RATE: 17 BRPM | DIASTOLIC BLOOD PRESSURE: 78 MMHG | WEIGHT: 265 LBS | SYSTOLIC BLOOD PRESSURE: 111 MMHG | HEIGHT: 71 IN | OXYGEN SATURATION: 97 % | HEART RATE: 87 BPM | BODY MASS INDEX: 37.1 KG/M2

## 2024-11-19 DIAGNOSIS — R39.15 URGENCY OF URINATION: ICD-10-CM

## 2024-11-19 DIAGNOSIS — N30.80 OTHER CYSTITIS W/OUT HEMATURIA: ICD-10-CM

## 2024-11-19 PROCEDURE — 99214 OFFICE O/P EST MOD 30 MIN: CPT

## 2024-11-19 PROCEDURE — G2211 COMPLEX E/M VISIT ADD ON: CPT

## 2024-11-19 RX ORDER — PHENAZOPYRIDINE 200 MG/1
200 TABLET, FILM COATED ORAL 3 TIMES DAILY
Qty: 30 | Refills: 0 | Status: ACTIVE | COMMUNITY
Start: 2024-11-19 | End: 1900-01-01

## 2024-12-10 NOTE — ASU PATIENT PROFILE, ADULT - NSTOBACCONEVERSMOKERY/N_GEN_A
Comprehensive Nutrition Assessment    Type and Reason for Visit:  Reassess, Nutrition support (TPN macronutrients)    **Addendum 12/10/24 at 1124: Spoke with Pharmacist and reports Dr Callahan plans to trial clear liquids and if patient does well with PO intake will possibly stop TPN 12/11 thus will use Clinimix 5/15 for this evening TPN bag and keep at 35ml/ hour to provide 596 kcals, 42 grams protein, 126 grams CHO/ 24 hours    Nutrition Recommendations/Plan:   With evening TPN bag, transition to 3-in-1 solution with dose weight 57kgm, 10 kcal/kgm, 1 gram protein/kgm, 20% lipid kcals - spoke with Pharmacist   Diet initiation per Surgery service      Malnutrition Assessment:  Malnutrition Status:  Severe malnutrition (12/09/24 1408)    Context:  Acute Illness     Findings of the 6 clinical characteristics of malnutrition:  Energy Intake:  50% or less of estimated energy requirements for 5 or more days (NPO ( had surgery 12/4))  Weight Loss:  1% to 2% over 1 week     Body Fat Loss:  Unable to assess     Muscle Mass Loss:  Unable to assess    Fluid Accumulation:  Unable to assess     Strength:  Not Performed    Nutrition Assessment:     Pt. severely malnourished AEB criteria listed above.  At risk for further nutritional compromise r/t admit with stricture of sigmoid colon, colovaginal fistula, HTN, increased needs for wound healing s/p surgery (12/4/24) see below and underlying medical condition (GERD, DVT, Blood Clots, Hypothyroidism, Pulmonary Embolism).       Nutrition Related Findings:    Pt. Report/Treatments/Miscellaneous: Patient seen with RN present and declines RD assessment, patient reports \"feeling rough\", NG tube had been to intermittent suction and now clamped with plans to start clear liquids, Clinimix TPN infusing at 35ml/ hour; PICC line placement 12/9/24    GI Status: BM today, NG tube with 350ml output 12/9-12/10   Pertinent Labs: sodium 140, potassium 3.1, BUN 17, Creatinine 0.8, glucose 126,  No

## 2025-01-08 NOTE — PRE-ANESTHESIA EVALUATION ADULT - NSANTHTOTALSCORECAL_ENT_A_CORE
Submitted PA for Ozempic (2 MG/DOSE) 8MG/3ML pen-injectors  Via CMM Key: [id number] STATUS: PENDING.    Follow up done daily; if no decision with in three days we will refax.  If another three days goes by with no decision will call the insurance for status.     0

## 2025-01-27 ENCOUNTER — APPOINTMENT (OUTPATIENT)
Dept: GASTROENTEROLOGY | Facility: CLINIC | Age: 50
End: 2025-01-27

## 2025-02-08 ENCOUNTER — NON-APPOINTMENT (OUTPATIENT)
Age: 50
End: 2025-02-08

## 2025-02-11 ENCOUNTER — NON-APPOINTMENT (OUTPATIENT)
Age: 50
End: 2025-02-11

## 2025-02-11 ENCOUNTER — APPOINTMENT (OUTPATIENT)
Dept: OBGYN | Facility: CLINIC | Age: 50
End: 2025-02-11
Payer: COMMERCIAL

## 2025-02-11 VITALS — HEIGHT: 71 IN | BODY MASS INDEX: 37.8 KG/M2 | WEIGHT: 270 LBS

## 2025-02-11 DIAGNOSIS — Z01.419 ENCOUNTER FOR GYNECOLOGICAL EXAMINATION (GENERAL) (ROUTINE) W/OUT ABNORMAL FINDINGS: ICD-10-CM

## 2025-02-11 DIAGNOSIS — N91.2 AMENORRHEA, UNSPECIFIED: ICD-10-CM

## 2025-02-11 PROCEDURE — 99396 PREV VISIT EST AGE 40-64: CPT

## 2025-02-11 RX ORDER — CONJUGATED ESTROGENS 0.62 MG/G
0.62 CREAM VAGINAL
Qty: 1 | Refills: 1 | Status: ACTIVE | COMMUNITY
Start: 2025-02-11 | End: 1900-01-01

## 2025-02-13 LAB — HPV HIGH+LOW RISK DNA PNL CVX: NOT DETECTED

## 2025-02-21 LAB — CYTOLOGY CVX/VAG DOC THIN PREP: NORMAL

## 2025-02-23 ENCOUNTER — NON-APPOINTMENT (OUTPATIENT)
Age: 50
End: 2025-02-23

## 2025-02-28 ENCOUNTER — NON-APPOINTMENT (OUTPATIENT)
Age: 50
End: 2025-02-28

## 2025-03-17 NOTE — BRIEF OPERATIVE NOTE - CONDITION POST OP
Last Office Visit: 10/3/2024   Next Office Visit: 4/3/2025                                          E-Scribed  Requested Prescriptions     Pending Prescriptions Disp Refills    acyclovir (ZOVIRAX) 200 MG capsule [Pharmacy Med Name: ACYCLOVIR 200 MG CAPSULE] 25 capsule 0     Sig: TAKE 1 CAPSULE BY MOUTH 5 TIMES DAILY AS NEEDED FOR COLD SORES                                
stable

## 2025-03-25 ENCOUNTER — APPOINTMENT (OUTPATIENT)
Dept: UROLOGY | Facility: CLINIC | Age: 50
End: 2025-03-25

## 2025-03-26 ENCOUNTER — NON-APPOINTMENT (OUTPATIENT)
Age: 50
End: 2025-03-26

## 2025-03-27 ENCOUNTER — NON-APPOINTMENT (OUTPATIENT)
Age: 50
End: 2025-03-27

## 2025-03-30 ENCOUNTER — NON-APPOINTMENT (OUTPATIENT)
Age: 50
End: 2025-03-30

## 2025-04-16 ENCOUNTER — RESULT REVIEW (OUTPATIENT)
Age: 50
End: 2025-04-16

## 2025-04-16 ENCOUNTER — OUTPATIENT (OUTPATIENT)
Dept: OUTPATIENT SERVICES | Facility: HOSPITAL | Age: 50
LOS: 1 days | End: 2025-04-16
Payer: COMMERCIAL

## 2025-04-16 DIAGNOSIS — K44.9 DIAPHRAGMATIC HERNIA WITHOUT OBSTRUCTION OR GANGRENE: ICD-10-CM

## 2025-04-16 DIAGNOSIS — Z98.890 OTHER SPECIFIED POSTPROCEDURAL STATES: ICD-10-CM

## 2025-04-16 DIAGNOSIS — Z98.890 OTHER SPECIFIED POSTPROCEDURAL STATES: Chronic | ICD-10-CM

## 2025-04-16 DIAGNOSIS — K25.1 ACUTE GASTRIC ULCER WITH PERFORATION: Chronic | ICD-10-CM

## 2025-04-16 PROCEDURE — 74220 X-RAY XM ESOPHAGUS 1CNTRST: CPT | Mod: 26

## 2025-04-16 PROCEDURE — 74220 X-RAY XM ESOPHAGUS 1CNTRST: CPT

## 2025-04-17 DIAGNOSIS — Z98.890 OTHER SPECIFIED POSTPROCEDURAL STATES: ICD-10-CM

## 2025-04-17 DIAGNOSIS — K44.9 DIAPHRAGMATIC HERNIA WITHOUT OBSTRUCTION OR GANGRENE: ICD-10-CM
